# Patient Record
Sex: MALE | Race: WHITE | Employment: FULL TIME | URBAN - METROPOLITAN AREA
[De-identification: names, ages, dates, MRNs, and addresses within clinical notes are randomized per-mention and may not be internally consistent; named-entity substitution may affect disease eponyms.]

---

## 2017-01-13 PROBLEM — R73.09 ELEVATED HEMOGLOBIN A1C MEASUREMENT: Status: ACTIVE | Noted: 2017-01-13

## 2017-09-07 PROBLEM — I10 ESSENTIAL HYPERTENSION: Status: ACTIVE | Noted: 2017-09-07

## 2017-11-27 ENCOUNTER — HOSPITAL ENCOUNTER (OUTPATIENT)
Dept: LAB | Age: 77
Discharge: HOME | End: 2017-11-27
Attending: INTERNAL MEDICINE
Payer: MEDICARE

## 2017-11-27 DIAGNOSIS — I25.10 CORONARY ARTERY DISEASE INVOLVING NATIVE CORONARY ARTERY OF NATIVE HEART WITHOUT ANGINA PECTORIS: ICD-10-CM

## 2017-11-27 DIAGNOSIS — E78.5 HYPERLIPIDEMIA, UNSPECIFIED HYPERLIPIDEMIA TYPE: Chronic | ICD-10-CM

## 2017-11-27 LAB
CHOLEST SERPL-MCNC: 187 MG/DL
HDLC SERPL-MCNC: 49 MG/DL (ref 40–60)
HDLC SERPL: 3.8 {RATIO}
LDLC SERPL CALC-MCNC: 112.6 MG/DL
LIPID PROFILE,FLP: ABNORMAL
TRIGL SERPL-MCNC: 127 MG/DL (ref 35–150)
VLDLC SERPL CALC-MCNC: 25.4 MG/DL (ref 6–23)

## 2017-11-27 PROCEDURE — 80061 LIPID PANEL: CPT | Performed by: INTERNAL MEDICINE

## 2017-11-27 PROCEDURE — 36415 COLL VENOUS BLD VENIPUNCTURE: CPT | Performed by: INTERNAL MEDICINE

## 2018-02-13 PROBLEM — I49.3 PVC (PREMATURE VENTRICULAR CONTRACTION): Status: ACTIVE | Noted: 2018-02-13

## 2019-10-21 ENCOUNTER — HOSPITAL ENCOUNTER (OUTPATIENT)
Dept: LAB | Age: 79
Discharge: HOME | End: 2019-10-21

## 2019-10-21 PROCEDURE — 88305 TISSUE EXAM BY PATHOLOGIST: CPT

## 2020-11-05 ENCOUNTER — TRANSCRIBE ORDER (OUTPATIENT)
Dept: SCHEDULING | Age: 80
End: 2020-11-05

## 2020-11-05 DIAGNOSIS — R42 DIZZINESS: Primary | ICD-10-CM

## 2020-11-06 ENCOUNTER — HOSPITAL ENCOUNTER (OUTPATIENT)
Dept: CT IMAGING | Age: 80
Discharge: HOME | End: 2020-11-06
Attending: INTERNAL MEDICINE
Payer: MEDICARE

## 2020-11-06 ENCOUNTER — HOSPITAL ENCOUNTER (OUTPATIENT)
Dept: LAB | Age: 80
Discharge: HOME | End: 2020-11-06
Attending: INTERNAL MEDICINE
Payer: MEDICARE

## 2020-11-06 ENCOUNTER — TRANSCRIBE ORDER (OUTPATIENT)
Dept: SCHEDULING | Age: 80
End: 2020-11-06

## 2020-11-06 DIAGNOSIS — R42 DIZZINESS: Primary | ICD-10-CM

## 2020-11-06 DIAGNOSIS — R79.89 ABNORMAL TSH: ICD-10-CM

## 2020-11-06 DIAGNOSIS — R19.8 ABNORMAL BOWEL MOVEMENT: ICD-10-CM

## 2020-11-06 DIAGNOSIS — I25.10 CORONARY ARTERY DISEASE INVOLVING NATIVE CORONARY ARTERY OF NATIVE HEART WITHOUT ANGINA PECTORIS: ICD-10-CM

## 2020-11-06 DIAGNOSIS — R42 DIZZINESS: ICD-10-CM

## 2020-11-06 DIAGNOSIS — Z00.00 MEDICARE ANNUAL WELLNESS VISIT, SUBSEQUENT: ICD-10-CM

## 2020-11-06 DIAGNOSIS — Z71.89 ADVANCED DIRECTIVES, COUNSELING/DISCUSSION: ICD-10-CM

## 2020-11-06 DIAGNOSIS — J42 CHRONIC BRONCHITIS, UNSPECIFIED CHRONIC BRONCHITIS TYPE (HCC): ICD-10-CM

## 2020-11-06 DIAGNOSIS — R73.01 IFG (IMPAIRED FASTING GLUCOSE): ICD-10-CM

## 2020-11-06 DIAGNOSIS — Z13.39 SCREENING FOR ALCOHOLISM: ICD-10-CM

## 2020-11-06 DIAGNOSIS — Z13.31 SCREENING FOR DEPRESSION: ICD-10-CM

## 2020-11-06 LAB
ALBUMIN SERPL-MCNC: 3.4 G/DL (ref 3.2–4.6)
ALBUMIN/GLOB SERPL: 1.2 {RATIO} (ref 1.2–3.5)
ALP SERPL-CCNC: 89 U/L (ref 50–136)
ALT SERPL-CCNC: 19 U/L (ref 12–65)
ANION GAP SERPL CALC-SCNC: 9 MMOL/L (ref 7–16)
AST SERPL-CCNC: 11 U/L (ref 15–37)
BILIRUB SERPL-MCNC: 0.8 MG/DL (ref 0.2–1.1)
BUN SERPL-MCNC: 24 MG/DL (ref 8–23)
CALCIUM SERPL-MCNC: 8.7 MG/DL (ref 8.3–10.4)
CHLORIDE SERPL-SCNC: 101 MMOL/L (ref 98–107)
CHOLEST SERPL-MCNC: 188 MG/DL
CO2 SERPL-SCNC: 28 MMOL/L (ref 21–32)
CREAT SERPL-MCNC: 0.97 MG/DL (ref 0.8–1.5)
CREAT UR-MCNC: 102 MG/DL
ERYTHROCYTE [DISTWIDTH] IN BLOOD BY AUTOMATED COUNT: 14.6 % (ref 11.9–14.6)
ERYTHROCYTE [SEDIMENTATION RATE] IN BLOOD: 15 MM/HR (ref 0–20)
EST. AVERAGE GLUCOSE BLD GHB EST-MCNC: 137 MG/DL
GLOBULIN SER CALC-MCNC: 2.9 G/DL (ref 2.3–3.5)
GLUCOSE SERPL-MCNC: 85 MG/DL (ref 65–100)
HBA1C MFR BLD: 6.4 %
HCT VFR BLD AUTO: 46.7 % (ref 41.1–50.3)
HDLC SERPL-MCNC: 77 MG/DL (ref 40–60)
HDLC SERPL: 2.4 {RATIO}
HGB BLD-MCNC: 15.4 G/DL (ref 13.6–17.2)
LDLC SERPL CALC-MCNC: 97.2 MG/DL
LIPID PROFILE,FLP: ABNORMAL
MCH RBC QN AUTO: 29.5 PG (ref 26.1–32.9)
MCHC RBC AUTO-ENTMCNC: 33 G/DL (ref 31.4–35)
MCV RBC AUTO: 89.5 FL (ref 79.6–97.8)
MICROALBUMIN UR-MCNC: 3.08 MG/DL
MICROALBUMIN/CREAT UR-RTO: 30 MG/G
NRBC # BLD: 0 K/UL (ref 0–0.2)
PLATELET # BLD AUTO: 223 K/UL (ref 150–450)
PMV BLD AUTO: 8.7 FL (ref 9.4–12.3)
POTASSIUM SERPL-SCNC: 3.8 MMOL/L (ref 3.5–5.1)
PROT SERPL-MCNC: 6.3 G/DL (ref 6.3–8.2)
RBC # BLD AUTO: 5.22 M/UL (ref 4.23–5.6)
SODIUM SERPL-SCNC: 138 MMOL/L (ref 136–145)
TRIGL SERPL-MCNC: 69 MG/DL (ref 35–150)
TSH SERPL DL<=0.005 MIU/L-ACNC: 2.88 UIU/ML
VIT B12 SERPL-MCNC: 390 PG/ML (ref 193–986)
VLDLC SERPL CALC-MCNC: 13.8 MG/DL (ref 6–23)
WBC # BLD AUTO: 8.7 K/UL (ref 4.3–11.1)

## 2020-11-06 PROCEDURE — 83036 HEMOGLOBIN GLYCOSYLATED A1C: CPT

## 2020-11-06 PROCEDURE — 85027 COMPLETE CBC AUTOMATED: CPT

## 2020-11-06 PROCEDURE — 82607 VITAMIN B-12: CPT

## 2020-11-06 PROCEDURE — 80061 LIPID PANEL: CPT

## 2020-11-06 PROCEDURE — 70450 CT HEAD/BRAIN W/O DYE: CPT

## 2020-11-06 PROCEDURE — 85652 RBC SED RATE AUTOMATED: CPT

## 2020-11-06 PROCEDURE — 84443 ASSAY THYROID STIM HORMONE: CPT

## 2020-11-06 PROCEDURE — 82043 UR ALBUMIN QUANTITATIVE: CPT

## 2020-11-06 PROCEDURE — 80053 COMPREHEN METABOLIC PANEL: CPT

## 2021-09-13 PROCEDURE — 88305 TISSUE EXAM BY PATHOLOGIST: CPT

## 2021-09-14 ENCOUNTER — HOSPITAL ENCOUNTER (OUTPATIENT)
Dept: LAB | Age: 81
Discharge: HOME | End: 2021-09-14

## 2022-03-18 PROBLEM — I10 ESSENTIAL HYPERTENSION: Status: ACTIVE | Noted: 2017-09-07

## 2022-03-19 PROBLEM — I49.3 PVC (PREMATURE VENTRICULAR CONTRACTION): Status: ACTIVE | Noted: 2018-02-13

## 2022-03-19 PROBLEM — R73.09 ELEVATED HEMOGLOBIN A1C MEASUREMENT: Status: ACTIVE | Noted: 2017-01-13

## 2022-05-12 ENCOUNTER — HOSPITAL ENCOUNTER (OUTPATIENT)
Dept: LAB | Age: 82
Discharge: HOME | End: 2022-05-12

## 2022-05-12 PROCEDURE — 88305 TISSUE EXAM BY PATHOLOGIST: CPT

## 2022-06-20 ENCOUNTER — OFFICE VISIT (OUTPATIENT)
Dept: INTERNAL MEDICINE CLINIC | Facility: CLINIC | Age: 82
End: 2022-06-20
Payer: MEDICARE

## 2022-06-20 VITALS
HEART RATE: 96 BPM | OXYGEN SATURATION: 94 % | SYSTOLIC BLOOD PRESSURE: 128 MMHG | HEIGHT: 73 IN | BODY MASS INDEX: 19.99 KG/M2 | DIASTOLIC BLOOD PRESSURE: 82 MMHG | WEIGHT: 150.8 LBS

## 2022-06-20 DIAGNOSIS — E53.8 VITAMIN B 12 DEFICIENCY: ICD-10-CM

## 2022-06-20 DIAGNOSIS — R53.83 OTHER FATIGUE: ICD-10-CM

## 2022-06-20 DIAGNOSIS — I10 ESSENTIAL HYPERTENSION: ICD-10-CM

## 2022-06-20 DIAGNOSIS — R41.82 ALTERED MENTAL STATUS, UNSPECIFIED ALTERED MENTAL STATUS TYPE: Primary | ICD-10-CM

## 2022-06-20 DIAGNOSIS — E87.6 HYPOKALEMIA: ICD-10-CM

## 2022-06-20 DIAGNOSIS — D50.9 IRON DEFICIENCY ANEMIA, UNSPECIFIED IRON DEFICIENCY ANEMIA TYPE: ICD-10-CM

## 2022-06-20 LAB
ALBUMIN SERPL-MCNC: 2.9 G/DL (ref 3.2–4.6)
ALBUMIN/GLOB SERPL: 0.7 {RATIO} (ref 1.2–3.5)
ALP SERPL-CCNC: 80 U/L (ref 50–136)
ALT SERPL-CCNC: 11 U/L (ref 12–65)
ANION GAP SERPL CALC-SCNC: 8 MMOL/L (ref 7–16)
AST SERPL-CCNC: 12 U/L (ref 15–37)
BASOPHILS # BLD: 0 K/UL (ref 0–0.2)
BASOPHILS NFR BLD: 1 % (ref 0–2)
BILIRUB SERPL-MCNC: 1 MG/DL (ref 0.2–1.1)
BUN SERPL-MCNC: 20 MG/DL (ref 8–23)
CALCIUM SERPL-MCNC: 8.8 MG/DL (ref 8.3–10.4)
CHLORIDE SERPL-SCNC: 101 MMOL/L (ref 98–107)
CO2 SERPL-SCNC: 30 MMOL/L (ref 21–32)
CREAT SERPL-MCNC: 0.9 MG/DL (ref 0.8–1.5)
DIFFERENTIAL METHOD BLD: ABNORMAL
EOSINOPHIL # BLD: 0.1 K/UL (ref 0–0.8)
EOSINOPHIL NFR BLD: 2 % (ref 0.5–7.8)
ERYTHROCYTE [DISTWIDTH] IN BLOOD BY AUTOMATED COUNT: 14 % (ref 11.9–14.6)
GLOBULIN SER CALC-MCNC: 4 G/DL (ref 2.3–3.5)
GLUCOSE SERPL-MCNC: 89 MG/DL (ref 65–100)
HCT VFR BLD AUTO: 39.7 % (ref 41.1–50.3)
HGB BLD-MCNC: 13 G/DL (ref 13.6–17.2)
IMM GRANULOCYTES # BLD AUTO: 0.1 K/UL (ref 0–0.5)
IMM GRANULOCYTES NFR BLD AUTO: 2 % (ref 0–5)
IRON SERPL-MCNC: 26 UG/DL (ref 35–150)
LYMPHOCYTES # BLD: 1.5 K/UL (ref 0.5–4.6)
LYMPHOCYTES NFR BLD: 32 % (ref 13–44)
MCH RBC QN AUTO: 28.5 PG (ref 26.1–32.9)
MCHC RBC AUTO-ENTMCNC: 32.7 G/DL (ref 31.4–35)
MCV RBC AUTO: 87.1 FL (ref 79.6–97.8)
MONOCYTES # BLD: 0.6 K/UL (ref 0.1–1.3)
MONOCYTES NFR BLD: 13 % (ref 4–12)
NEUTS SEG # BLD: 2.3 K/UL (ref 1.7–8.2)
NEUTS SEG NFR BLD: 50 % (ref 43–78)
NRBC # BLD: 0 K/UL (ref 0–0.2)
PLATELET # BLD AUTO: 239 K/UL (ref 150–450)
PMV BLD AUTO: 9.6 FL (ref 9.4–12.3)
POTASSIUM SERPL-SCNC: 2.8 MMOL/L (ref 3.5–5.1)
PROT SERPL-MCNC: 6.9 G/DL (ref 6.3–8.2)
RBC # BLD AUTO: 4.56 M/UL (ref 4.23–5.6)
SODIUM SERPL-SCNC: 139 MMOL/L (ref 136–145)
TSH, 3RD GENERATION: 2.02 UIU/ML (ref 0.36–3.74)
WBC # BLD AUTO: 4.6 K/UL (ref 4.3–11.1)

## 2022-06-20 PROCEDURE — 96372 THER/PROPH/DIAG INJ SC/IM: CPT | Performed by: INTERNAL MEDICINE

## 2022-06-20 PROCEDURE — 99215 OFFICE O/P EST HI 40 MIN: CPT | Performed by: INTERNAL MEDICINE

## 2022-06-20 PROCEDURE — 1036F TOBACCO NON-USER: CPT | Performed by: INTERNAL MEDICINE

## 2022-06-20 PROCEDURE — G8427 DOCREV CUR MEDS BY ELIG CLIN: HCPCS | Performed by: INTERNAL MEDICINE

## 2022-06-20 PROCEDURE — 1123F ACP DISCUSS/DSCN MKR DOCD: CPT | Performed by: INTERNAL MEDICINE

## 2022-06-20 PROCEDURE — G8420 CALC BMI NORM PARAMETERS: HCPCS | Performed by: INTERNAL MEDICINE

## 2022-06-20 RX ORDER — ESCITALOPRAM OXALATE 10 MG/1
10 TABLET ORAL DAILY
Qty: 30 TABLET | Refills: 1 | Status: SHIPPED | OUTPATIENT
Start: 2022-06-20 | End: 2022-10-20

## 2022-06-20 RX ORDER — CYANOCOBALAMIN 1000 UG/ML
1000 INJECTION INTRAMUSCULAR; SUBCUTANEOUS ONCE
Status: COMPLETED | OUTPATIENT
Start: 2022-06-20 | End: 2022-06-20

## 2022-06-20 RX ADMIN — CYANOCOBALAMIN 1000 MCG: 1000 INJECTION INTRAMUSCULAR; SUBCUTANEOUS at 16:39

## 2022-06-20 ASSESSMENT — ENCOUNTER SYMPTOMS
CHEST TIGHTNESS: 0
SHORTNESS OF BREATH: 0

## 2022-06-20 ASSESSMENT — PATIENT HEALTH QUESTIONNAIRE - PHQ9
SUM OF ALL RESPONSES TO PHQ QUESTIONS 1-9: 2
SUM OF ALL RESPONSES TO PHQ9 QUESTIONS 1 & 2: 2
SUM OF ALL RESPONSES TO PHQ QUESTIONS 1-9: 2
SUM OF ALL RESPONSES TO PHQ QUESTIONS 1-9: 2
1. LITTLE INTEREST OR PLEASURE IN DOING THINGS: 2
SUM OF ALL RESPONSES TO PHQ QUESTIONS 1-9: 2
2. FEELING DOWN, DEPRESSED OR HOPELESS: 0

## 2022-06-20 NOTE — PROGRESS NOTES
Marilyn Rausch was seen today for fatigue, weight loss and other. Diagnoses and all orders for this visit:    Altered mental status, unspecified altered mental status type  -     MRI BRAIN WO CONTRAST; Future  -     CBC with Auto Differential; Future  -     TSH; Future  -     Comprehensive Metabolic Panel; Future  -     Comprehensive Metabolic Panel  -     TSH  -     CBC with Auto Differential    Essential hypertension  -     Comprehensive Metabolic Panel; Future  -     Comprehensive Metabolic Panel    Other fatigue  -     CBC with Auto Differential; Future  -     TSH; Future  -     Comprehensive Metabolic Panel; Future  -     Comprehensive Metabolic Panel  -     TSH  -     CBC with Auto Differential    Iron deficiency anemia, unspecified iron deficiency anemia type  -     Iron; Future  -     Iron    Vitamin B 12 deficiency  -     cyanocobalamin injection 1,000 mcg    Other orders  -     escitalopram (LEXAPRO) 10 MG tablet; Take 1 tablet by mouth daily  -     potassium chloride (KLOR-CON M) 20 MEQ extended release tablet;  Take 1 tablet by mouth 3 times daily          Arianna Ibrahim is a 78 y.o. male    Chief Complaint   Patient presents with    Fatigue     GI did labs results scan in    Weight Loss    Other     patient states he thinks he has had a stroke         Results for orders placed or performed in visit on 06/20/22   Iron   Result Value Ref Range    Iron 26 (L) 35 - 150 ug/dL   Comprehensive Metabolic Panel   Result Value Ref Range    Sodium 139 136 - 145 mmol/L    Potassium 2.8 (L) 3.5 - 5.1 mmol/L    Chloride 101 98 - 107 mmol/L    CO2 30 21 - 32 mmol/L    Anion Gap 8 7 - 16 mmol/L    Glucose 89 65 - 100 mg/dL    BUN 20 8 - 23 MG/DL    CREATININE 0.90 0.8 - 1.5 MG/DL    GFR African American >60 >60 ml/min/1.73m2    GFR Non- >60 >60 ml/min/1.73m2    Calcium 8.8 8.3 - 10.4 MG/DL    Total Bilirubin 1.0 0.2 - 1.1 MG/DL    ALT 11 (L) 12 - 65 U/L    AST 12 (L) 15 - 37 U/L    Alk Phosphatase 80 50 - 136 U/L    Total Protein 6.9 6.3 - 8.2 g/dL    Albumin 2.9 (L) 3.2 - 4.6 g/dL    Globulin 4.0 (H) 2.3 - 3.5 g/dL    Albumin/Globulin Ratio 0.7 (L) 1.2 - 3.5     TSH   Result Value Ref Range    TSH, 3RD GENERATION 2.020 0.358 - 3.740 uIU/mL   CBC with Auto Differential   Result Value Ref Range    WBC 4.6 4.3 - 11.1 K/uL    RBC 4.56 4.23 - 5.6 M/uL    Hemoglobin 13.0 (L) 13.6 - 17.2 g/dL    Hematocrit 39.7 (L) 41.1 - 50.3 %    MCV 87.1 79.6 - 97.8 FL    MCH 28.5 26.1 - 32.9 PG    MCHC 32.7 31.4 - 35.0 g/dL    RDW 14.0 11.9 - 14.6 %    Platelets 976 204 - 733 K/uL    MPV 9.6 9.4 - 12.3 FL    nRBC 0.00 0.0 - 0.2 K/uL    Differential Type AUTOMATED      Seg Neutrophils 50 43 - 78 %    Lymphocytes 32 13 - 44 %    Monocytes 13 (H) 4.0 - 12.0 %    Eosinophils % 2 0.5 - 7.8 %    Basophils 1 0.0 - 2.0 %    Immature Granulocytes 2 0.0 - 5.0 %    Segs Absolute 2.3 1.7 - 8.2 K/UL    Absolute Lymph # 1.5 0.5 - 4.6 K/UL    Absolute Mono # 0.6 0.1 - 1.3 K/UL    Absolute Eos # 0.1 0.0 - 0.8 K/UL    Basophils Absolute 0.0 0.0 - 0.2 K/UL    Absolute Immature Granulocyte 0.1 0.0 - 0.5 K/UL       Past Medical History:   Diagnosis Date    Abnormal cardiovascular function study     Aortic valve regurgitation 01/04/2016    Echo:LV EF=55%. Normal LV size. Mild to moderate AI with mildly dilated aortic root (3.8 cm) and aortic sclerosis.  Bilateral cataracts     Colon polyps     COPD (chronic obstructive pulmonary disease) (HCC)     Coronary atherosclerosis of native coronary vessel 02/12/2016    Coronary clcifications noted on chest CT scan on 12-4-2015. Normal myocardial perfusion scan (normal perfusion and EF=76%) on 2-12-16.     Elevated hemoglobin A1c measurement 1/13/2017    Hyperlipidemia 9/6/2016       Family History   Problem Relation Age of Onset    Cancer Father         Colon    Hypertension Mother         Social History     Socioeconomic History    Marital status:      Spouse name: Not on file  Number of children: Not on file    Years of education: Not on file    Highest education level: Not on file   Occupational History    Not on file   Tobacco Use    Smoking status: Former Smoker     Quit date: 1990     Years since quittin.5    Smokeless tobacco: Never Used   Substance and Sexual Activity    Alcohol use: No     Alcohol/week: 0.0 standard drinks    Drug use: No    Sexual activity: Not on file   Other Topics Concern    Not on file   Social History Narrative    Not on file     Social Determinants of Health     Financial Resource Strain:     Difficulty of Paying Living Expenses: Not on file   Food Insecurity:     Worried About [address] [employer] in the Last Year: Not on file    [employer] in the Last Year: Not on file   Transportation Needs:     Lack of Transportation (Medical): Not on file    Lack of Transportation (Non-Medical):  Not on file   Physical Activity:     Days of Exercise per Week: Not on file    Minutes of Exercise per Session: Not on file   Stress:     Feeling of Stress : Not on file   Social Connections:     Frequency of Communication with Friends and Family: Not on file    Frequency of Social Gatherings with Friends and Family: Not on file    Attends Anabaptism Services: Not on file    Active Member of [address] [place] [address] [employer] or Organizations: Not on file    Attends Club or Organization Meetings: Not on file    Marital Status: Not on file   Intimate Partner Violence:     Fear of Current or Ex-Partner: Not on file    Emotionally Abused: Not on file    Physically Abused: Not on file    Sexually Abused: Not on file   Housing Stability:     Unable to Pay for Housing in the Last Year: Not on file    Number of [place] in the Last Year: Not on file    Unstable Housing in the Last Year: Not on file         Current Outpatient Medications:     potassium chloride (KLOR-CON M) 20 MEQ extended release tablet, Take 1 tablet by mouth 3 times daily, Disp: 45 tablet, Rfl: 1    escitalopram (LEXAPRO) 10 MG tablet, Take 1 tablet by mouth daily, Disp: 30 tablet, Rfl: 1    cyanocobalamin 1000 MCG/ML injection, Inject 1,000 mcg into the muscle every 30 days (Patient not taking: Reported on 6/20/2022), Disp: , Rfl:     Allergies   Allergen Reactions    Erythromycin Rash    Sulfa Antibiotics Rash         Review of Systems   Constitutional: Positive for fatigue and unexpected weight change. Respiratory: Negative for chest tightness and shortness of breath. Cardiovascular: Negative for chest pain. Genitourinary: Negative for dysuria. Skin: Negative for rash. Neurological: Negative for dizziness. Psychiatric/Behavioral: Negative for agitation, dysphoric mood and suicidal ideas. The patient is not nervous/anxious. Vitals:    06/20/22 1550   BP: 128/82   Pulse: 96   SpO2: 94%   Weight: 150 lb 12.8 oz (68.4 kg)   Height: 6' 1\" (1.854 m)           Physical Exam  Constitutional:       General: He is not in acute distress. Appearance: He is underweight. He is not ill-appearing. HENT:      Head: Normocephalic and atraumatic. Nose: Nose normal.   Eyes:      General: No scleral icterus. Extraocular Movements: Extraocular movements intact. Conjunctiva/sclera: Conjunctivae normal.   Cardiovascular:      Rate and Rhythm: Normal rate and regular rhythm. Pulses: Normal pulses. Heart sounds: Normal heart sounds. Pulmonary:      Effort: Pulmonary effort is normal. No respiratory distress. Breath sounds: Normal breath sounds. Abdominal:      General: Abdomen is flat. Bowel sounds are normal.      Palpations: Abdomen is soft. Musculoskeletal:      Cervical back: Neck supple. No rigidity. Skin:     Coloration: Skin is not jaundiced. Neurological:      General: No focal deficit present. Mental Status: He is alert and oriented to person, place, and time. Cranial Nerves: Cranial nerves are intact.       Coordination: Coordination is intact. Gait: Gait abnormal.   Psychiatric:         Mood and Affect: Mood normal.         Thought Content: Thought content normal.            Makeda Butler was seen today for fatigue, weight loss and other. Diagnoses and all orders for this visit:    Altered mental status, unspecified altered mental status type  -     MRI BRAIN WO CONTRAST; Future  -     CBC with Auto Differential; Future  -     TSH; Future  -     Comprehensive Metabolic Panel; Future  -     Comprehensive Metabolic Panel  -     TSH  -     CBC with Auto Differential    Essential hypertension  -     Comprehensive Metabolic Panel; Future  -     Comprehensive Metabolic Panel    Other fatigue  -     CBC with Auto Differential; Future  -     TSH; Future  -     Comprehensive Metabolic Panel; Future  -     Comprehensive Metabolic Panel  -     TSH  -     CBC with Auto Differential    Iron deficiency anemia, unspecified iron deficiency anemia type  -     Iron; Future  -     Iron    Vitamin B 12 deficiency  -     cyanocobalamin injection 1,000 mcg    Other orders  -     escitalopram (LEXAPRO) 10 MG tablet; Take 1 tablet by mouth daily  -     potassium chloride (KLOR-CON M) 20 MEQ extended release tablet;  Take 1 tablet by mouth 3 times daily                 Devika Garcia, DO

## 2022-06-21 RX ORDER — POTASSIUM CHLORIDE 20 MEQ/1
20 TABLET, EXTENDED RELEASE ORAL 3 TIMES DAILY
Qty: 45 TABLET | Refills: 1 | Status: SHIPPED | OUTPATIENT
Start: 2022-06-21 | End: 2022-10-20

## 2022-06-24 ENCOUNTER — NURSE ONLY (OUTPATIENT)
Dept: INTERNAL MEDICINE CLINIC | Facility: CLINIC | Age: 82
End: 2022-06-24

## 2022-06-24 DIAGNOSIS — E87.6 HYPOKALEMIA: Primary | ICD-10-CM

## 2022-06-24 LAB — POTASSIUM SERPL-SCNC: 4.4 MMOL/L (ref 3.5–5.1)

## 2022-07-05 ENCOUNTER — OFFICE VISIT (OUTPATIENT)
Dept: INTERNAL MEDICINE CLINIC | Facility: CLINIC | Age: 82
End: 2022-07-05
Payer: MEDICARE

## 2022-07-05 VITALS
HEART RATE: 61 BPM | TEMPERATURE: 97.1 F | DIASTOLIC BLOOD PRESSURE: 78 MMHG | WEIGHT: 142 LBS | SYSTOLIC BLOOD PRESSURE: 130 MMHG | HEIGHT: 73 IN | BODY MASS INDEX: 18.82 KG/M2 | OXYGEN SATURATION: 97 %

## 2022-07-05 DIAGNOSIS — E87.6 HYPOKALEMIA: ICD-10-CM

## 2022-07-05 DIAGNOSIS — R63.4 WEIGHT LOSS: Primary | ICD-10-CM

## 2022-07-05 DIAGNOSIS — F33.2 SEVERE EPISODE OF RECURRENT MAJOR DEPRESSIVE DISORDER, WITHOUT PSYCHOTIC FEATURES (HCC): ICD-10-CM

## 2022-07-05 DIAGNOSIS — I10 ESSENTIAL HYPERTENSION: ICD-10-CM

## 2022-07-05 PROBLEM — F33.9 MAJOR DEPRESSIVE DISORDER, RECURRENT, UNSPECIFIED (HCC): Status: ACTIVE | Noted: 2022-07-05

## 2022-07-05 LAB
ALBUMIN SERPL-MCNC: 3.3 G/DL (ref 3.2–4.6)
ALBUMIN/GLOB SERPL: 0.8 {RATIO} (ref 1.2–3.5)
ALP SERPL-CCNC: 98 U/L (ref 50–136)
ALT SERPL-CCNC: 13 U/L (ref 12–65)
ANION GAP SERPL CALC-SCNC: 6 MMOL/L (ref 7–16)
AST SERPL-CCNC: 14 U/L (ref 15–37)
BASOPHILS # BLD: 0 K/UL (ref 0–0.2)
BASOPHILS NFR BLD: 1 % (ref 0–2)
BILIRUB SERPL-MCNC: 0.9 MG/DL (ref 0.2–1.1)
BILIRUBIN, URINE, POC: NEGATIVE
BLOOD URINE, POC: NEGATIVE
BUN SERPL-MCNC: 24 MG/DL (ref 8–23)
CALCIUM SERPL-MCNC: 9.5 MG/DL (ref 8.3–10.4)
CHLORIDE SERPL-SCNC: 101 MMOL/L (ref 98–107)
CK SERPL-CCNC: 28 U/L (ref 21–215)
CO2 SERPL-SCNC: 27 MMOL/L (ref 21–32)
CREAT SERPL-MCNC: 1 MG/DL (ref 0.8–1.5)
CRP SERPL-MCNC: 1.1 MG/DL (ref 0–0.9)
DIFFERENTIAL METHOD BLD: ABNORMAL
EOSINOPHIL # BLD: 0.1 K/UL (ref 0–0.8)
EOSINOPHIL NFR BLD: 1 % (ref 0.5–7.8)
ERYTHROCYTE [DISTWIDTH] IN BLOOD BY AUTOMATED COUNT: 14.4 % (ref 11.9–14.6)
ERYTHROCYTE [SEDIMENTATION RATE] IN BLOOD: 14 MM/HR
GLOBULIN SER CALC-MCNC: 4.1 G/DL (ref 2.3–3.5)
GLUCOSE SERPL-MCNC: 100 MG/DL (ref 65–100)
GLUCOSE URINE, POC: NEGATIVE
HCT VFR BLD AUTO: 41.9 % (ref 41.1–50.3)
HGB BLD-MCNC: 13.4 G/DL (ref 13.6–17.2)
IMM GRANULOCYTES # BLD AUTO: 0.1 K/UL (ref 0–0.5)
IMM GRANULOCYTES NFR BLD AUTO: 1 % (ref 0–5)
KETONES, URINE, POC: NEGATIVE
LEUKOCYTE ESTERASE, URINE, POC: NEGATIVE
LYMPHOCYTES # BLD: 1.3 K/UL (ref 0.5–4.6)
LYMPHOCYTES NFR BLD: 22 % (ref 13–44)
MCH RBC QN AUTO: 28 PG (ref 26.1–32.9)
MCHC RBC AUTO-ENTMCNC: 32 G/DL (ref 31.4–35)
MCV RBC AUTO: 87.7 FL (ref 79.6–97.8)
MONOCYTES # BLD: 0.8 K/UL (ref 0.1–1.3)
MONOCYTES NFR BLD: 14 % (ref 4–12)
NEUTS SEG # BLD: 3.5 K/UL (ref 1.7–8.2)
NEUTS SEG NFR BLD: 61 % (ref 43–78)
NITRITE, URINE, POC: NEGATIVE
NRBC # BLD: 0 K/UL (ref 0–0.2)
PH, URINE, POC: 5 (ref 4.6–8)
PLATELET # BLD AUTO: 250 K/UL (ref 150–450)
PMV BLD AUTO: 9.2 FL (ref 9.4–12.3)
POTASSIUM SERPL-SCNC: 4.6 MMOL/L (ref 3.5–5.1)
PROT SERPL-MCNC: 7.4 G/DL (ref 6.3–8.2)
PROTEIN,URINE, POC: NORMAL
RBC # BLD AUTO: 4.78 M/UL (ref 4.23–5.6)
SODIUM SERPL-SCNC: 134 MMOL/L (ref 138–145)
SPECIFIC GRAVITY, URINE, POC: 1.03 (ref 1–1.03)
URINALYSIS CLARITY, POC: CLEAR
URINALYSIS COLOR, POC: YELLOW
UROBILINOGEN, POC: NORMAL
WBC # BLD AUTO: 5.7 K/UL (ref 4.3–11.1)

## 2022-07-05 PROCEDURE — 81003 URINALYSIS AUTO W/O SCOPE: CPT | Performed by: INTERNAL MEDICINE

## 2022-07-05 PROCEDURE — 1123F ACP DISCUSS/DSCN MKR DOCD: CPT | Performed by: INTERNAL MEDICINE

## 2022-07-05 PROCEDURE — G8420 CALC BMI NORM PARAMETERS: HCPCS | Performed by: INTERNAL MEDICINE

## 2022-07-05 PROCEDURE — G8427 DOCREV CUR MEDS BY ELIG CLIN: HCPCS | Performed by: INTERNAL MEDICINE

## 2022-07-05 PROCEDURE — 99214 OFFICE O/P EST MOD 30 MIN: CPT | Performed by: INTERNAL MEDICINE

## 2022-07-05 PROCEDURE — 1036F TOBACCO NON-USER: CPT | Performed by: INTERNAL MEDICINE

## 2022-07-05 RX ORDER — BUPROPION HYDROCHLORIDE 150 MG/1
TABLET ORAL
Qty: 30 TABLET | Refills: 5 | Status: SHIPPED | OUTPATIENT
Start: 2022-07-05 | End: 2022-07-27

## 2022-07-05 ASSESSMENT — PATIENT HEALTH QUESTIONNAIRE - PHQ9
1. LITTLE INTEREST OR PLEASURE IN DOING THINGS: 0
SUM OF ALL RESPONSES TO PHQ QUESTIONS 1-9: 0
SUM OF ALL RESPONSES TO PHQ QUESTIONS 1-9: 0
2. FEELING DOWN, DEPRESSED OR HOPELESS: 0
SUM OF ALL RESPONSES TO PHQ9 QUESTIONS 1 & 2: 0
SUM OF ALL RESPONSES TO PHQ QUESTIONS 1-9: 0
SUM OF ALL RESPONSES TO PHQ QUESTIONS 1-9: 0

## 2022-07-05 NOTE — PROGRESS NOTES
Susu Verdugo was seen today for follow-up. Diagnoses and all orders for this visit:    Weight loss  -     CBC with Auto Differential; Future  -     XR CHEST PA LAT (2 VIEWS); Future  -     Sedimentation Rate; Future  -     CK; Future  -     C-Reactive Protein; Future  -     CK; Future  -     AMB POC URINALYSIS DIP STICK AUTO W/O MICRO  -     CK  -     C-Reactive Protein  -     Sedimentation Rate  -     CBC with Auto Differential    Essential hypertension  -     Sedimentation Rate; Future  -     CK; Future  -     C-Reactive Protein; Future  -     AMB POC URINALYSIS DIP STICK AUTO W/O MICRO  -     C-Reactive Protein  -     Sedimentation Rate    Hypokalemia  -     Comprehensive Metabolic Panel; Future  -     Sedimentation Rate; Future  -     CK; Future  -     C-Reactive Protein; Future  -     CK;  Future  -     CK  -     C-Reactive Protein  -     Sedimentation Rate  -     Comprehensive Metabolic Panel    Severe episode of recurrent major depressive disorder, without psychotic features (HCC)  -     AMB POC URINALYSIS DIP STICK AUTO W/O MICRO    Other orders  -     buPROPion (WELLBUTRIN XL) 150 MG extended release tablet; 1 every am  In addition to other meds          Gentry Ba is a 78 y.o. male    Chief Complaint   Patient presents with    Follow-up     2 week   hypokalemia  No energy  No walking much at all        Results for orders placed or performed in visit on 07/05/22   CK   Result Value Ref Range    Total CK 28 21 - 215 U/L   C-Reactive Protein   Result Value Ref Range    CRP 1.1 (H) 0.0 - 0.9 mg/dL   Sedimentation Rate   Result Value Ref Range    Sed Rate, Automated 14 (L) 15 mm/hr   Comprehensive Metabolic Panel   Result Value Ref Range    Sodium 134 (L) 138 - 145 mmol/L    Potassium 4.6 3.5 - 5.1 mmol/L    Chloride 101 98 - 107 mmol/L    CO2 27 21 - 32 mmol/L    Anion Gap 6 (L) 7 - 16 mmol/L    Glucose 100 65 - 100 mg/dL    BUN 24 (H) 8 - 23 MG/DL    CREATININE 1.00 0.8 - 1.5 MG/DL    GFR African American >60 >60 ml/min/1.73m2    GFR Non- >60 >60 ml/min/1.73m2    Calcium 9.5 8.3 - 10.4 MG/DL    Total Bilirubin 0.9 0.2 - 1.1 MG/DL    ALT 13 12 - 65 U/L    AST 14 (L) 15 - 37 U/L    Alk Phosphatase 98 50 - 136 U/L    Total Protein 7.4 6.3 - 8.2 g/dL    Albumin 3.3 3.2 - 4.6 g/dL    Globulin 4.1 (H) 2.3 - 3.5 g/dL    Albumin/Globulin Ratio 0.8 (L) 1.2 - 3.5     CBC with Auto Differential   Result Value Ref Range    WBC 5.7 4.3 - 11.1 K/uL    RBC 4.78 4.23 - 5.6 M/uL    Hemoglobin 13.4 (L) 13.6 - 17.2 g/dL    Hematocrit 41.9 41.1 - 50.3 %    MCV 87.7 79.6 - 97.8 FL    MCH 28.0 26.1 - 32.9 PG    MCHC 32.0 31.4 - 35.0 g/dL    RDW 14.4 11.9 - 14.6 %    Platelets 799 809 - 952 K/uL    MPV 9.2 (L) 9.4 - 12.3 FL    nRBC 0.00 0.0 - 0.2 K/uL    Differential Type AUTOMATED      Seg Neutrophils 61 43 - 78 %    Lymphocytes 22 13 - 44 %    Monocytes 14 (H) 4.0 - 12.0 %    Eosinophils % 1 0.5 - 7.8 %    Basophils 1 0.0 - 2.0 %    Immature Granulocytes 1 0.0 - 5.0 %    Segs Absolute 3.5 1.7 - 8.2 K/UL    Absolute Lymph # 1.3 0.5 - 4.6 K/UL    Absolute Mono # 0.8 0.1 - 1.3 K/UL    Absolute Eos # 0.1 0.0 - 0.8 K/UL    Basophils Absolute 0.0 0.0 - 0.2 K/UL    Absolute Immature Granulocyte 0.1 0.0 - 0.5 K/UL   AMB POC URINALYSIS DIP STICK AUTO W/O MICRO   Result Value Ref Range    Color (UA POC) Yellow     Clarity (UA POC) Clear     Glucose, Urine, POC Negative Negative    Bilirubin, Urine, POC Negative Negative    KETONES, Urine, POC Negative Negative    Specific Gravity, Urine, POC 1.030 1.001 - 1.035    Blood (UA POC) Negative Negative    pH, Urine, POC 5.0 4.6 - 8.0    Protein, Urine, POC Trace Negative    Urobilinogen, POC 0.2 mg/dL     Nitrite, Urine, POC Negative Negative    Leukocyte Esterase, Urine, POC Negative Negative       Past Medical History:   Diagnosis Date    Abnormal cardiovascular function study     Aortic valve regurgitation 01/04/2016    Echo:LV EF=55%. Normal LV size. Mild to moderate AI with mildly dilated aortic root (3.8 cm) and aortic sclerosis.  Bilateral cataracts     Colon polyps     COPD (chronic obstructive pulmonary disease) (HCC)     Coronary atherosclerosis of native coronary vessel 2016    Coronary clcifications noted on chest CT scan on 2015. Normal myocardial perfusion scan (normal perfusion and EF=76%) on 16.  Elevated hemoglobin A1c measurement 2017    Hyperlipidemia 2016       Family History   Problem Relation Age of Onset    Cancer Father         Colon    Hypertension Mother         Social History     Socioeconomic History    Marital status:      Spouse name: Not on file    Number of children: Not on file    Years of education: Not on file    Highest education level: Not on file   Occupational History    Not on file   Tobacco Use    Smoking status: Former Smoker     Quit date: 1990     Years since quittin.6    Smokeless tobacco: Never Used   Substance and Sexual Activity    Alcohol use: No     Alcohol/week: 0.0 standard drinks    Drug use: No    Sexual activity: Not on file   Other Topics Concern    Not on file   Social History Narrative    Not on file     Social Determinants of Health     Financial Resource Strain:     Difficulty of Paying Living Expenses: Not on file   Food Insecurity:     Worried About [email or web address] in the Last Year: Not on file    [address] in the Last Year: Not on file   Transportation Needs:     Lack of Transportation (Medical): Not on file    Lack of Transportation (Non-Medical):  Not on file   Physical Activity:     Days of Exercise per Week: Not on file    Minutes of Exercise per Session: Not on file   Stress:     Feeling of Stress : Not on file   Social Connections:     Frequency of Communication with Friends and Family: Not on file    Frequency of Social Gatherings with Friends and Family: Not on file    Attends Baptism Services: Not on file   [employer] or Organizations: Not on file    Attends Club or Organization Meetings: Not on file    Marital Status: Not on file   Intimate Partner Violence:     Fear of Current or Ex-Partner: Not on file    Emotionally Abused: Not on file    Physically Abused: Not on file    Sexually Abused: Not on file   Housing Stability:     Unable to Pay for Housing in the Last Year: Not on file    Number of [place] in the Last Year: Not on file    Unstable Housing in the Last Year: Not on file         Current Outpatient Medications:     buPROPion (WELLBUTRIN XL) 150 MG extended release tablet, 1 every am  In addition to other meds, Disp: 30 tablet, Rfl: 5    potassium chloride (KLOR-CON M) 20 MEQ extended release tablet, Take 1 tablet by mouth 3 times daily, Disp: 45 tablet, Rfl: 1    escitalopram (LEXAPRO) 10 MG tablet, Take 1 tablet by mouth daily, Disp: 30 tablet, Rfl: 1    cyanocobalamin 1000 MCG/ML injection, Inject 1,000 mcg into the muscle every 30 days , Disp: , Rfl:     Allergies   Allergen Reactions    Erythromycin Rash    Sulfa Antibiotics Rash         Review of Systems   Constitutional: Negative for appetite change, chills, fatigue and fever. HENT: Negative for sinus pain. Respiratory: Negative for chest tightness, shortness of breath and wheezing. Cardiovascular: Negative for chest pain. Gastrointestinal: Negative for abdominal pain, constipation, diarrhea, nausea and vomiting. Genitourinary: Negative for dysuria and frequency. Musculoskeletal: Negative for myalgias. Neurological: Negative for dizziness. Psychiatric/Behavioral: Negative for suicidal ideas. All other systems reviewed and are negative. Vitals:    07/05/22 1014   BP: 130/78   Pulse: 61   Temp: 97.1 °F (36.2 °C)   TempSrc: Temporal   SpO2: 97%   Weight: 142 lb (64.4 kg)   Height: 6' 1\" (1.854 m)           Physical Exam  Constitutional:       Appearance: He is underweight. He is not ill-appearing. Cardiovascular:      Rate and Rhythm: Normal rate. Pulmonary:      Effort: Pulmonary effort is normal.      Breath sounds: Normal breath sounds. Abdominal:      General: There is no distension. Musculoskeletal:      Cervical back: Neck supple. Lymphadenopathy:      Cervical: No cervical adenopathy. Skin:     Coloration: Skin is not jaundiced. Neurological:      General: No focal deficit present. Mental Status: He is alert. Mental status is at baseline. Psychiatric:         Mood and Affect: Mood normal.         Thought Content: Thought content normal.            Christie Ashley was seen today for follow-up. Diagnoses and all orders for this visit:    Weight loss  -     CBC with Auto Differential; Future  -     XR CHEST PA LAT (2 VIEWS); Future  -     Sedimentation Rate; Future  -     CK; Future  -     C-Reactive Protein; Future  -     CK; Future  -     AMB POC URINALYSIS DIP STICK AUTO W/O MICRO  -     CK  -     C-Reactive Protein  -     Sedimentation Rate  -     CBC with Auto Differential    Essential hypertension  -     Sedimentation Rate; Future  -     CK; Future  -     C-Reactive Protein; Future  -     AMB POC URINALYSIS DIP STICK AUTO W/O MICRO  -     C-Reactive Protein  -     Sedimentation Rate    Hypokalemia  -     Comprehensive Metabolic Panel; Future  -     Sedimentation Rate; Future  -     CK; Future  -     C-Reactive Protein; Future  -     CK;  Future  -     CK  -     C-Reactive Protein  -     Sedimentation Rate  -     Comprehensive Metabolic Panel    Severe episode of recurrent major depressive disorder, without psychotic features (HCC)  -     AMB POC URINALYSIS DIP STICK AUTO W/O MICRO    Other orders  -     buPROPion (WELLBUTRIN XL) 150 MG extended release tablet; 1 every am  In addition to other meds                 Kalyan Aguilar DO

## 2022-07-07 ENCOUNTER — HOSPITAL ENCOUNTER (OUTPATIENT)
Dept: MRI IMAGING | Age: 82
Discharge: HOME | End: 2022-07-10
Payer: MEDICARE

## 2022-07-07 DIAGNOSIS — R41.82 ALTERED MENTAL STATUS, UNSPECIFIED ALTERED MENTAL STATUS TYPE: ICD-10-CM

## 2022-07-07 PROCEDURE — 70551 MRI BRAIN STEM W/O DYE: CPT

## 2022-07-11 ASSESSMENT — ENCOUNTER SYMPTOMS
CONSTIPATION: 0
SINUS PAIN: 0
VOMITING: 0
ABDOMINAL PAIN: 0
NAUSEA: 0
WHEEZING: 0
CHEST TIGHTNESS: 0
DIARRHEA: 0
SHORTNESS OF BREATH: 0

## 2022-07-20 ENCOUNTER — NURSE ONLY (OUTPATIENT)
Dept: INTERNAL MEDICINE CLINIC | Facility: CLINIC | Age: 82
End: 2022-07-20

## 2022-07-20 DIAGNOSIS — E87.6 HYPOKALEMIA: ICD-10-CM

## 2022-07-20 DIAGNOSIS — D72.821 MONOCYTOSES: Primary | ICD-10-CM

## 2022-07-20 LAB
ALBUMIN SERPL-MCNC: 3 G/DL (ref 3.2–4.6)
ALBUMIN/GLOB SERPL: 0.8 {RATIO} (ref 1.2–3.5)
ALP SERPL-CCNC: 98 U/L (ref 50–136)
ALT SERPL-CCNC: 10 U/L (ref 12–65)
ANION GAP SERPL CALC-SCNC: 7 MMOL/L (ref 7–16)
AST SERPL-CCNC: 13 U/L (ref 15–37)
BASOPHILS # BLD: 0 K/UL (ref 0–0.2)
BASOPHILS NFR BLD: 1 % (ref 0–2)
BILIRUB SERPL-MCNC: 1.1 MG/DL (ref 0.2–1.1)
BUN SERPL-MCNC: 18 MG/DL (ref 8–23)
CALCIUM SERPL-MCNC: 8.6 MG/DL (ref 8.3–10.4)
CHLORIDE SERPL-SCNC: 101 MMOL/L (ref 98–107)
CO2 SERPL-SCNC: 25 MMOL/L (ref 21–32)
CREAT SERPL-MCNC: 0.9 MG/DL (ref 0.8–1.5)
DIFFERENTIAL METHOD BLD: ABNORMAL
EOSINOPHIL # BLD: 0.1 K/UL (ref 0–0.8)
EOSINOPHIL NFR BLD: 2 % (ref 0.5–7.8)
ERYTHROCYTE [DISTWIDTH] IN BLOOD BY AUTOMATED COUNT: 14.7 % (ref 11.9–14.6)
GLOBULIN SER CALC-MCNC: 3.8 G/DL (ref 2.3–3.5)
GLUCOSE SERPL-MCNC: 95 MG/DL (ref 65–100)
HCT VFR BLD AUTO: 39.8 % (ref 41.1–50.3)
HETEROPH AB BLD QL IA: NEGATIVE
HGB BLD-MCNC: 12.4 G/DL (ref 13.6–17.2)
IMM GRANULOCYTES # BLD AUTO: 0 K/UL (ref 0–0.5)
IMM GRANULOCYTES NFR BLD AUTO: 1 % (ref 0–5)
LYMPHOCYTES # BLD: 1 K/UL (ref 0.5–4.6)
LYMPHOCYTES NFR BLD: 19 % (ref 13–44)
MCH RBC QN AUTO: 27.9 PG (ref 26.1–32.9)
MCHC RBC AUTO-ENTMCNC: 31.2 G/DL (ref 31.4–35)
MCV RBC AUTO: 89.4 FL (ref 79.6–97.8)
MONOCYTES # BLD: 0.6 K/UL (ref 0.1–1.3)
MONOCYTES NFR BLD: 11 % (ref 4–12)
NEUTS SEG # BLD: 3.3 K/UL (ref 1.7–8.2)
NEUTS SEG NFR BLD: 66 % (ref 43–78)
NRBC # BLD: 0 K/UL (ref 0–0.2)
PLATELET # BLD AUTO: 238 K/UL (ref 150–450)
PMV BLD AUTO: 9.4 FL (ref 9.4–12.3)
POTASSIUM SERPL-SCNC: 3.5 MMOL/L (ref 3.5–5.1)
PROT SERPL-MCNC: 6.8 G/DL (ref 6.3–8.2)
RBC # BLD AUTO: 4.45 M/UL (ref 4.23–5.6)
SODIUM SERPL-SCNC: 133 MMOL/L (ref 138–145)
WBC # BLD AUTO: 5 K/UL (ref 4.3–11.1)

## 2022-07-27 ENCOUNTER — OFFICE VISIT (OUTPATIENT)
Dept: INTERNAL MEDICINE CLINIC | Facility: CLINIC | Age: 82
End: 2022-07-27
Payer: MEDICARE

## 2022-07-27 VITALS
DIASTOLIC BLOOD PRESSURE: 82 MMHG | HEIGHT: 73 IN | WEIGHT: 145 LBS | SYSTOLIC BLOOD PRESSURE: 130 MMHG | BODY MASS INDEX: 19.22 KG/M2 | OXYGEN SATURATION: 95 % | HEART RATE: 96 BPM | TEMPERATURE: 97 F

## 2022-07-27 DIAGNOSIS — R63.4 WEIGHT LOSS: ICD-10-CM

## 2022-07-27 DIAGNOSIS — R29.6 FALLING EPISODES: ICD-10-CM

## 2022-07-27 DIAGNOSIS — E53.8 VITAMIN B 12 DEFICIENCY: Primary | ICD-10-CM

## 2022-07-27 LAB
ANION GAP SERPL CALC-SCNC: 5 MMOL/L (ref 7–16)
BUN SERPL-MCNC: 18 MG/DL (ref 8–23)
CALCIUM SERPL-MCNC: 9.3 MG/DL (ref 8.3–10.4)
CHLORIDE SERPL-SCNC: 102 MMOL/L (ref 98–107)
CO2 SERPL-SCNC: 29 MMOL/L (ref 21–32)
CREAT SERPL-MCNC: 0.9 MG/DL (ref 0.8–1.5)
CREAT UR-MCNC: 162 MG/DL
CRP SERPL-MCNC: 2.9 MG/DL (ref 0–0.9)
GLUCOSE SERPL-MCNC: 95 MG/DL (ref 65–100)
MICROALBUMIN UR-MCNC: 3.3 MG/DL
MICROALBUMIN/CREAT UR-RTO: 20 MG/G
POTASSIUM SERPL-SCNC: 3.4 MMOL/L (ref 3.5–5.1)
SODIUM SERPL-SCNC: 136 MMOL/L (ref 138–145)
TSH, 3RD GENERATION: 2.24 UIU/ML (ref 0.36–3.74)

## 2022-07-27 PROCEDURE — G8420 CALC BMI NORM PARAMETERS: HCPCS | Performed by: INTERNAL MEDICINE

## 2022-07-27 PROCEDURE — 1036F TOBACCO NON-USER: CPT | Performed by: INTERNAL MEDICINE

## 2022-07-27 PROCEDURE — G8427 DOCREV CUR MEDS BY ELIG CLIN: HCPCS | Performed by: INTERNAL MEDICINE

## 2022-07-27 PROCEDURE — 96372 THER/PROPH/DIAG INJ SC/IM: CPT | Performed by: INTERNAL MEDICINE

## 2022-07-27 PROCEDURE — 1123F ACP DISCUSS/DSCN MKR DOCD: CPT | Performed by: INTERNAL MEDICINE

## 2022-07-27 PROCEDURE — 99214 OFFICE O/P EST MOD 30 MIN: CPT | Performed by: INTERNAL MEDICINE

## 2022-07-27 RX ORDER — CYANOCOBALAMIN 1000 UG/ML
1000 INJECTION INTRAMUSCULAR; SUBCUTANEOUS
Status: SHIPPED | OUTPATIENT
Start: 2022-07-27

## 2022-07-27 RX ADMIN — CYANOCOBALAMIN 1000 MCG: 1000 INJECTION INTRAMUSCULAR; SUBCUTANEOUS at 10:29

## 2022-07-27 NOTE — PROGRESS NOTES
Keith Rendon was seen today for follow-up. Diagnoses and all orders for this visit:    Vitamin B 12 deficiency  -     cyanocobalamin injection 1,000 mcg  -     Sedimentation Rate; Future  -     C-Reactive Protein; Future  -     Basic Metabolic Panel; Future  -     TSH; Future  -     Sedimentation Rate  -     C-Reactive Protein  -     Basic Metabolic Panel  -     TSH    Falling episodes  -     [facility], [facility]  -     Sedimentation Rate; Future  -     C-Reactive Protein; Future  -     Basic Metabolic Panel; Future  -     TSH; Future  -     Sedimentation Rate  -     C-Reactive Protein  -     Basic Metabolic Panel  -     TSH    Weight loss  -     Sedimentation Rate; Future  -     C-Reactive Protein; Future  -     Basic Metabolic Panel; Future  -     XR CHEST PA LAT (2 VIEWS); Future  -     TSH; Future  -     Microalbumin / Creatinine Urine Ratio; Future  -     Sedimentation Rate  -     C-Reactive Protein  -     Basic Metabolic Panel  -     TSH  -     Microalbumin / Creatinine Urine Ratio        Angela Hernandez is a 78 y.o. male    Chief Complaint   Patient presents with    Follow-up     6 month and review labs     Seems to miss his work   Trying eat more  Decided not take meds at all-lexapro/wellbutrin. Seemed dpressed in the rcent weeks. No results found for this visit on 07/27/22. Past Medical History:   Diagnosis Date    Abnormal cardiovascular function study     Aortic valve regurgitation 01/04/2016    Echo:LV EF=55%. Normal LV size. Mild to moderate AI with mildly dilated aortic root (3.8 cm) and aortic sclerosis. Bilateral cataracts     Colon polyps     COPD (chronic obstructive pulmonary disease) (HCC)     Coronary atherosclerosis of native coronary vessel 02/12/2016    Coronary clcifications noted on chest CT scan on 12-4-2015. Normal myocardial perfusion scan (normal perfusion and EF=76%) on 2-12-16.     Elevated hemoglobin A1c measurement 1/13/2017 Hyperlipidemia 2016       Family History   Problem Relation Age of Onset    Cancer Father         Colon    Hypertension Mother         Social History     Socioeconomic History    Marital status:      Spouse name: Not on file    Number of children: Not on file    Years of education: Not on file    Highest education level: Not on file   Occupational History    Not on file   Tobacco Use    Smoking status: Former     Types: Cigarettes     Quit date: 1990     Years since quittin.6    Smokeless tobacco: Never   Substance and Sexual Activity    Alcohol use: No     Alcohol/week: 0.0 standard drinks    Drug use: No    Sexual activity: Not on file   Other Topics Concern    Not on file   Social History Narrative    Not on file     Social Determinants of Health     Financial Resource Strain: Not on file   Food Insecurity: Not on file   Transportation Needs: Not on file   Physical Activity: Not on file   Stress: Not on file   Social Connections: Not on file   Intimate Partner Violence: Not on file   Housing Stability: Not on file         Current Outpatient Medications:     cyanocobalamin 1000 MCG/ML injection, Inject 1,000 mcg into the muscle every 30 days , Disp: , Rfl:     potassium chloride (KLOR-CON M) 20 MEQ extended release tablet, Take 1 tablet by mouth 3 times daily (Patient not taking: Reported on 2022), Disp: 45 tablet, Rfl: 1    escitalopram (LEXAPRO) 10 MG tablet, Take 1 tablet by mouth daily (Patient not taking: Reported on 2022), Disp: 30 tablet, Rfl: 1    Allergies   Allergen Reactions    Erythromycin Rash    Sulfa Antibiotics Rash         Review of Systems      Vitals:    22 1023   BP: 130/82   Pulse: 96   Temp: 97 °F (36.1 °C)   TempSrc: Temporal   SpO2: 95%   Weight: 145 lb (65.8 kg)   Height: 6' 1\" (1.854 m)           Physical Exam       Moody Lefort was seen today for follow-up.     Diagnoses and all orders for this visit:    Vitamin B 12 deficiency  -     cyanocobalamin injection 1,000 mcg  -     Sedimentation Rate; Future  -     C-Reactive Protein; Future  -     Basic Metabolic Panel; Future  -     TSH; Future  -     Sedimentation Rate  -     C-Reactive Protein  -     Basic Metabolic Panel  -     TSH    Falling episodes  -     [facility]  -     Sedimentation Rate; Future  -     C-Reactive Protein; Future  -     Basic Metabolic Panel; Future  -     TSH; Future  -     Sedimentation Rate  -     C-Reactive Protein  -     Basic Metabolic Panel  -     TSH    Weight loss  -     Sedimentation Rate; Future  -     C-Reactive Protein; Future  -     Basic Metabolic Panel; Future  -     XR CHEST PA LAT (2 VIEWS); Future  -     TSH; Future  -     Microalbumin / Creatinine Urine Ratio;  Future  -     Sedimentation Rate  -     C-Reactive Protein  -     Basic Metabolic Panel  -     TSH  -     Microalbumin / Creatinine Urine Ratio               Patricia Chowdhury DO

## 2022-07-28 LAB — ERYTHROCYTE [SEDIMENTATION RATE] IN BLOOD: 37 MM/HR

## 2022-07-28 RX ORDER — PREDNISONE 1 MG/1
5 TABLET ORAL 2 TIMES DAILY
Qty: 60 TABLET | Refills: 0 | Status: SHIPPED | OUTPATIENT
Start: 2022-07-28 | End: 2022-08-15 | Stop reason: SDUPTHER

## 2022-08-10 ENCOUNTER — HOSPITAL ENCOUNTER (EMERGENCY)
Age: 82
Discharge: HOME | End: 2022-08-10
Attending: EMERGENCY MEDICINE
Payer: MEDICARE

## 2022-08-10 ENCOUNTER — HOSPITAL ENCOUNTER (EMERGENCY)
Dept: GENERAL RADIOLOGY | Age: 82
Discharge: HOME | End: 2022-08-13
Payer: MEDICARE

## 2022-08-10 ENCOUNTER — HOSPITAL ENCOUNTER (EMERGENCY)
Dept: CT IMAGING | Age: 82
Discharge: HOME | End: 2022-08-13
Payer: MEDICARE

## 2022-08-10 VITALS
HEIGHT: 73 IN | HEART RATE: 82 BPM | RESPIRATION RATE: 18 BRPM | TEMPERATURE: 97.9 F | DIASTOLIC BLOOD PRESSURE: 88 MMHG | SYSTOLIC BLOOD PRESSURE: 149 MMHG | WEIGHT: 147 LBS | OXYGEN SATURATION: 100 % | BODY MASS INDEX: 19.48 KG/M2

## 2022-08-10 DIAGNOSIS — E87.6 HYPOKALEMIA: ICD-10-CM

## 2022-08-10 DIAGNOSIS — E86.0 DEHYDRATION: ICD-10-CM

## 2022-08-10 DIAGNOSIS — R41.0 CONFUSION: Primary | ICD-10-CM

## 2022-08-10 LAB
ALBUMIN SERPL-MCNC: 2.8 G/DL (ref 3.2–4.6)
ALBUMIN/GLOB SERPL: 0.6 {RATIO} (ref 1.2–3.5)
ALP SERPL-CCNC: 93 U/L (ref 50–136)
ALT SERPL-CCNC: 12 U/L (ref 12–65)
ANION GAP SERPL CALC-SCNC: 7 MMOL/L (ref 7–16)
APPEARANCE UR: CLEAR
AST SERPL-CCNC: 16 U/L (ref 15–37)
BACTERIA URNS QL MICRO: NEGATIVE /HPF
BASOPHILS # BLD: 0.1 K/UL (ref 0–0.2)
BASOPHILS NFR BLD: 1 % (ref 0–2)
BILIRUB SERPL-MCNC: 1 MG/DL (ref 0.2–1.1)
BILIRUB UR QL: NEGATIVE
BUN SERPL-MCNC: 23 MG/DL (ref 8–23)
CALCIUM SERPL-MCNC: 8.6 MG/DL (ref 8.3–10.4)
CASTS URNS QL MICRO: ABNORMAL /LPF
CHLORIDE SERPL-SCNC: 103 MMOL/L (ref 98–107)
CO2 SERPL-SCNC: 25 MMOL/L (ref 21–32)
COLOR UR: ABNORMAL
CREAT SERPL-MCNC: 0.85 MG/DL (ref 0.8–1.5)
DIFFERENTIAL METHOD BLD: ABNORMAL
EOSINOPHIL # BLD: 0.1 K/UL (ref 0–0.8)
EOSINOPHIL NFR BLD: 2 % (ref 0.5–7.8)
EPI CELLS #/AREA URNS HPF: ABNORMAL /HPF
ERYTHROCYTE [DISTWIDTH] IN BLOOD BY AUTOMATED COUNT: 15.6 % (ref 11.9–14.6)
GLOBULIN SER CALC-MCNC: 4.8 G/DL (ref 2.3–3.5)
GLUCOSE BLD STRIP.AUTO-MCNC: 83 MG/DL (ref 65–100)
GLUCOSE SERPL-MCNC: 80 MG/DL (ref 65–100)
GLUCOSE UR STRIP.AUTO-MCNC: NEGATIVE MG/DL
HCT VFR BLD AUTO: 40 % (ref 41.1–50.3)
HGB BLD-MCNC: 12.6 G/DL (ref 13.6–17.2)
HGB UR QL STRIP: NEGATIVE
IMM GRANULOCYTES # BLD AUTO: 0.4 K/UL (ref 0–0.5)
IMM GRANULOCYTES NFR BLD AUTO: 5 % (ref 0–5)
KETONES UR QL STRIP.AUTO: NEGATIVE MG/DL
LACTATE SERPL-SCNC: 2.1 MMOL/L (ref 0.4–2)
LACTATE SERPL-SCNC: 2.3 MMOL/L (ref 0.4–2)
LEUKOCYTE ESTERASE UR QL STRIP.AUTO: ABNORMAL
LYMPHOCYTES # BLD: 1.3 K/UL (ref 0.5–4.6)
LYMPHOCYTES NFR BLD: 17 % (ref 13–44)
MCH RBC QN AUTO: 26.8 PG (ref 26.1–32.9)
MCHC RBC AUTO-ENTMCNC: 31.5 G/DL (ref 31.4–35)
MCV RBC AUTO: 85.1 FL (ref 79.6–97.8)
MONOCYTES # BLD: 0.6 K/UL (ref 0.1–1.3)
MONOCYTES NFR BLD: 8 % (ref 4–12)
NEUTS SEG # BLD: 5.2 K/UL (ref 1.7–8.2)
NEUTS SEG NFR BLD: 68 % (ref 43–78)
NITRITE UR QL STRIP.AUTO: NEGATIVE
NRBC # BLD: 0 K/UL (ref 0–0.2)
PH UR STRIP: 5.5 [PH] (ref 5–9)
PLATELET # BLD AUTO: 238 K/UL (ref 150–450)
PMV BLD AUTO: 9.2 FL (ref 9.4–12.3)
POTASSIUM SERPL-SCNC: 3.2 MMOL/L (ref 3.5–5.1)
PROT SERPL-MCNC: 7.6 G/DL (ref 6.3–8.2)
PROT UR STRIP-MCNC: NEGATIVE MG/DL
RBC # BLD AUTO: 4.7 M/UL (ref 4.23–5.6)
RBC #/AREA URNS HPF: ABNORMAL /HPF
SERVICE CMNT-IMP: NORMAL
SODIUM SERPL-SCNC: 135 MMOL/L (ref 136–145)
SP GR UR REFRACTOMETRY: 1.01 (ref 1–1.02)
T4 FREE SERPL-MCNC: 1 NG/DL (ref 0.78–1.46)
TSH W FREE THYROID IF ABNORMAL: 3.8 UIU/ML (ref 0.36–3.74)
UROBILINOGEN UR QL STRIP.AUTO: 1 EU/DL (ref 0.2–1)
WBC # BLD AUTO: 7.6 K/UL (ref 4.3–11.1)
WBC URNS QL MICRO: ABNORMAL /HPF

## 2022-08-10 PROCEDURE — 84439 ASSAY OF FREE THYROXINE: CPT

## 2022-08-10 PROCEDURE — 71045 X-RAY EXAM CHEST 1 VIEW: CPT

## 2022-08-10 PROCEDURE — 93005 ELECTROCARDIOGRAM TRACING: CPT | Performed by: STUDENT IN AN ORGANIZED HEALTH CARE EDUCATION/TRAINING PROGRAM

## 2022-08-10 PROCEDURE — 70450 CT HEAD/BRAIN W/O DYE: CPT

## 2022-08-10 PROCEDURE — 84443 ASSAY THYROID STIM HORMONE: CPT

## 2022-08-10 PROCEDURE — 81001 URINALYSIS AUTO W/SCOPE: CPT

## 2022-08-10 PROCEDURE — 6370000000 HC RX 637 (ALT 250 FOR IP): Performed by: EMERGENCY MEDICINE

## 2022-08-10 PROCEDURE — 80053 COMPREHEN METABOLIC PANEL: CPT

## 2022-08-10 PROCEDURE — 99285 EMERGENCY DEPT VISIT HI MDM: CPT

## 2022-08-10 PROCEDURE — 85025 COMPLETE CBC W/AUTO DIFF WBC: CPT

## 2022-08-10 PROCEDURE — 83605 ASSAY OF LACTIC ACID: CPT

## 2022-08-10 PROCEDURE — 82962 GLUCOSE BLOOD TEST: CPT

## 2022-08-10 RX ORDER — POTASSIUM CHLORIDE 20 MEQ/1
40 TABLET, EXTENDED RELEASE ORAL ONCE
Status: COMPLETED | OUTPATIENT
Start: 2022-08-10 | End: 2022-08-10

## 2022-08-10 RX ORDER — 0.9 % SODIUM CHLORIDE 0.9 %
1000 INTRAVENOUS SOLUTION INTRAVENOUS ONCE
Status: DISCONTINUED | OUTPATIENT
Start: 2022-08-10 | End: 2022-08-10 | Stop reason: HOSPADM

## 2022-08-10 RX ADMIN — POTASSIUM CHLORIDE 40 MEQ: 1500 TABLET, EXTENDED RELEASE ORAL at 20:13

## 2022-08-10 ASSESSMENT — PAIN SCALES - GENERAL
PAINLEVEL_OUTOF10: 0
PAINLEVEL_OUTOF10: 0

## 2022-08-10 ASSESSMENT — ENCOUNTER SYMPTOMS
GASTROINTESTINAL NEGATIVE: 1
RESPIRATORY NEGATIVE: 1

## 2022-08-10 ASSESSMENT — PAIN - FUNCTIONAL ASSESSMENT
PAIN_FUNCTIONAL_ASSESSMENT: 0-10
PAIN_FUNCTIONAL_ASSESSMENT: 0-10

## 2022-08-10 NOTE — ED TRIAGE NOTES
Patient ambulatory to triage with ex-spouse. Patient was next door getting an infusion around 1330 and they stated that he was confused as to why he was there, stated he showed up at 0830 earlier that morning thinking his appointment was then and then seemed confused throughout his afternoon appointment. Patient able to answer questions in triage.

## 2022-08-10 NOTE — ED NOTES
Took report from Marcela, [facility]. Did vitals.  [id number] [facility] Thomas, RN  08/10/22 9013

## 2022-08-11 ENCOUNTER — CARE COORDINATION (OUTPATIENT)
Dept: CARE COORDINATION | Facility: CLINIC | Age: 82
End: 2022-08-11

## 2022-08-11 LAB
EKG ATRIAL RATE: 93 BPM
EKG DIAGNOSIS: NORMAL
EKG P AXIS: 70 DEGREES
EKG P-R INTERVAL: 238 MS
EKG Q-T INTERVAL: 382 MS
EKG QRS DURATION: 96 MS
EKG QTC CALCULATION (BAZETT): 474 MS
EKG R AXIS: 25 DEGREES
EKG T AXIS: 62 DEGREES
EKG VENTRICULAR RATE: 93 BPM

## 2022-08-11 NOTE — CARE COORDINATION
Ambulatory Care Coordination Note  8/11/2022    ACC: Dwain , RN    Summary Note: Spoke w/ pt regarding ED SHIRA. Pt states he went to have breakfast this morning and fell asleep in his car upon his return home. RNCM attempted to update contact information as his emergency contact listed was his sister whom he states has passed away, this name removed. Other contact who is friend lives out of state. Pt attempted to look up his exwife's contact information and phone was disconnected. Pt called RNCM back and provided a number for his exwife however when RNCM called it, it was the wrong number. Attempted to review pt medications, pt stated he was not on any medication. When I asked him to look again he stated he was suppose to be taking Prednisone but hadn't taken it today. He read off instructions on how to take and stated he would take one now and one this evening. RNCM called [address] regarding KCL, 15d supply on 6/21/22. Prescription has not been refilled. RNCM called [employer] to request wellcheck d/t pt's continued confusion, falling sleep in his car. Information provided to medic who will do check. RNCM spoke w/ pt again and stated EMS had called him, and he informed them he was on his porch and doing fine. Discussed upcoming visit w/ PCP, confirmed date and time w/ pt. He has not children. Pt provided number for his exwife Jamie Neighbor, [phone]. RNCM called and left vm w/ contact information requesting call back. RNCM will continue to follow in CCM services. Pt has RNCM contact information. Prior to Admission medications    Medication Sig Start Date End Date Taking? Authorizing Provider   predniSONE (DELTASONE) 5 MG tablet Take 1 tablet by mouth in the morning and 1 tablet before bedtime.  7/28/22 8/27/22  Misa Decker,    potassium chloride (KLOR-CON M) 20 MEQ extended release tablet Take 1 tablet by mouth 3 times daily  Patient not taking: Reported on 7/27/2022 6/21/22   My Le DO   escitalopram (LEXAPRO) 10 MG tablet Take 1 tablet by mouth daily  Patient not taking: Reported on 7/27/2022 6/20/22   My Le DO   cyanocobalamin 1000 MCG/ML injection Inject 1,000 mcg into the muscle every 30 days  11/16/21   Ar Automatic Reconciliation       Future Appointments   Date Time Provider rTung Sorensen   8/26/2022  9:45 AM My Le DO [facility]      and   General Assessment

## 2022-08-11 NOTE — ED PROVIDER NOTES
[facility] Emergency Department Provider Note                   PCP:                Lacey Galvan DO               Age: 78 y.o. Sex: male       ICD-10-CM    1. Confusion  R41.0       2. Dehydration  E86.0       3. Hypokalemia  E87.6           DISPOSITION Decision To Discharge 08/10/2022 09:00:48 PM       MDM  Number of Diagnoses or Management Options  Confusion  Dehydration  Hypokalemia  Diagnosis management comments: 75-year-old male present emergency department with intermittent confusion. Patient initially did not know the date nor the president was but patient cleared mentally while in the emergency department. Patient was slightly dehydrated and hypokalemic. Patient was given IV fluids and potassium replacement. Patient discharged home and return to emergency department for any concerns. He will follow-up with his family doctor.        Amount and/or Complexity of Data Reviewed  Clinical lab tests: ordered and reviewed  Tests in the radiology section of CPT®: ordered and reviewed  Decide to obtain previous medical records or to obtain history from someone other than the patient: yes  Obtain history from someone other than the patient: yes  Review and summarize past medical records: yes  Independent visualization of images, tracings, or specimens: yes    Patient Progress  Patient progress: improved       Orders Placed This Encounter   Procedures    XR CHEST PORTABLE    CT Head W/O Contrast    CBC with Auto Differential    CMP    Lactic Acid Now and in 2 Hours    TSH with Reflex    T4, Free    Urinalysis w rflx microscopic    POCT Urine Dipstick    Pulse Oximetry    POCT Glucose    POCT Glucose    EKG 12 Lead    Saline lock IV        Lizy Long is a 78 y.o. male who presents to the Emergency Department with chief complaint of    Chief Complaint   Patient presents with    Altered Mental Status     Confusion per GI doctor      75-year-old  male presented to the emergency department for evaluation for intermittent confusion. Patient was at his monthly Remicade infusion for ulcerative colitis when he was noted to be intermittently confused. Per patient's ex-wife, patient has been intermittently confused and this has been increasing over the past year. When patient was initially triaged, he was unsure what the date was or who the president was however he has now alert and oriented. Patient states that he feels okay and has no complaints currently. He denies any fevers, chills, chest pain, shortness of breath, nausea, vomiting, diarrhea, changes in bowel or bladder habits or other concerns. The history is provided by the patient and the spouse. All other systems reviewed and are negative. Review of Systems   Constitutional: Negative. HENT: Negative. Respiratory: Negative. Cardiovascular: Negative. Gastrointestinal: Negative. Genitourinary: Negative. Musculoskeletal: Negative. Neurological: Negative. Hematological: Negative. Psychiatric/Behavioral:  Positive for confusion. All other systems reviewed and are negative. Past Medical History:   Diagnosis Date    Abnormal cardiovascular function study     Aortic valve regurgitation 01/04/2016    Echo:LV EF=55%. Normal LV size. Mild to moderate AI with mildly dilated aortic root (3.8 cm) and aortic sclerosis. Bilateral cataracts     Colon polyps     COPD (chronic obstructive pulmonary disease) (HCC)     Coronary atherosclerosis of native coronary vessel 02/12/2016    Coronary clcifications noted on chest CT scan on 12-4-2015. Normal myocardial perfusion scan (normal perfusion and EF=76%) on 2-12-16.     Elevated hemoglobin A1c measurement 1/13/2017    Hyperlipidemia 9/6/2016        Past Surgical History:   Procedure Laterality Date    CATARACT REMOVAL      bilat    COLONOSCOPY      2015    POLYPECTOMY      Colon        Family History   Problem Relation Age of Onset    Cancer Father         Colon Hypertension Mother         Social History     Socioeconomic History    Marital status:    Tobacco Use    Smoking status: Former     Types: Cigarettes     Quit date: 1990     Years since quittin.7    Smokeless tobacco: Never   Substance and Sexual Activity    Alcohol use: No     Alcohol/week: 0.0 standard drinks    Drug use: No        Allergies: Erythromycin and Sulfa antibiotics    Discharge Medication List as of 8/10/2022  9:01 PM        CONTINUE these medications which have NOT CHANGED    Details   predniSONE (DELTASONE) 5 MG tablet Take 1 tablet by mouth in the morning and 1 tablet before bedtime. , Disp-60 tablet, R-0Normal      potassium chloride (KLOR-CON M) 20 MEQ extended release tablet Take 1 tablet by mouth 3 times daily, Disp-45 tablet, R-1Normal      escitalopram (LEXAPRO) 10 MG tablet Take 1 tablet by mouth daily, Disp-30 tablet, R-1Normal      cyanocobalamin 1000 MCG/ML injection Inject 1,000 mcg into the muscle every 30 days Historical Med              Vitals signs and nursing note reviewed. Patient Vitals for the past 4 hrs:   Pulse Resp BP SpO2   08/10/22 2108 82 -- (!) 149/88 100 %   08/10/22 1908 85 18 (!) 155/89 100 %   08/10/22 1821 86 16 (!) 156/88 96 %          Physical Exam  Vitals and nursing note reviewed. Constitutional:       General: He is not in acute distress. Appearance: He is well-developed. He is not ill-appearing or toxic-appearing. HENT:      Head: Normocephalic and atraumatic. Mouth/Throat:      Comments: Mildly dry mucous membranes  Eyes:      Extraocular Movements: Extraocular movements intact. Pupils: Pupils are equal, round, and reactive to light. Cardiovascular:      Rate and Rhythm: Normal rate and regular rhythm. Heart sounds: Normal heart sounds. Pulmonary:      Effort: Pulmonary effort is normal.      Breath sounds: Normal breath sounds. Abdominal:      General: There is no distension. Palpations: Abdomen is soft. Tenderness: There is no abdominal tenderness. Musculoskeletal:         General: Normal range of motion. Cervical back: Normal range of motion. Skin:     General: Skin is warm and dry. Neurological:      Mental Status: He is alert and oriented to person, place, and time. Mental status is at baseline. GCS: GCS eye subscore is 4. GCS verbal subscore is 5. GCS motor subscore is 6. Psychiatric:         Mood and Affect: Mood normal.         Speech: Speech normal.         Behavior: Behavior normal.        ED EKG Interpretation  EKG was interpreted in the absence of a cardiologist.    Rate: Rate 93  EKG Interpretation: Sinus rhythm with first-degree AV block. Incomplete right bundle branch block. Nonspecific ST and T wave abnormalities. Normal QT.   ST Segments: Nonspecific ST segments - NO STEMI    Labs Reviewed   CBC WITH AUTO DIFFERENTIAL - Abnormal; Notable for the following components:       Result Value    Hemoglobin 12.6 (*)     Hematocrit 40.0 (*)     RDW 15.6 (*)     MPV 9.2 (*)     All other components within normal limits   COMPREHENSIVE METABOLIC PANEL - Abnormal; Notable for the following components:    Sodium 135 (*)     Potassium 3.2 (*)     Albumin 2.8 (*)     Globulin 4.8 (*)     Albumin/Globulin Ratio 0.6 (*)     All other components within normal limits   LACTIC ACID - Abnormal; Notable for the following components:    Lactic Acid, Plasma 2.1 (*)     All other components within normal limits   LACTIC ACID - Abnormal; Notable for the following components:    Lactic Acid, Plasma 2.3 (*)     All other components within normal limits   TSH WITH REFLEX - Abnormal; Notable for the following components:    TSH w Free Thyroid if Abnormal 3.80 (*)     All other components within normal limits   URINALYSIS - Abnormal; Notable for the following components:    Leukocyte Esterase, Urine TRACE (*)     All other components within normal limits   T4, FREE   POCT GLUCOSE   POCT GLUCOSE        CT Head W/O Contrast   Final Result   No acute intracranial abnormality evident by CT. XR CHEST PORTABLE   Final Result   New opacities in the right costophrenic angle, could reflect trace effusion and   airspace disease such as atelectasis or infiltrate. ED Course as of 08/10/22 2201   Wed Aug 10, 2022   2100 Patient's lactic acid is essentially unchanged. Patient had not received his fluids. Patient received his fluids after the lactic acid was redrawn. Patient feels better and will be discharged home. [JL]      ED Course User Index  [JL] Queen Justine MD        Voice dictation software was used during the making of this note. This software is not perfect and grammatical and other typographical errors may be present. This note has not been completely proofread for errors.        Queen Justine MD  08/10/22 2209

## 2022-08-11 NOTE — DISCHARGE INSTRUCTIONS
Follow-up with your doctor this week. Return to the emergency department for any concerns. Plenty of fluids.

## 2022-08-15 ENCOUNTER — OFFICE VISIT (OUTPATIENT)
Dept: INTERNAL MEDICINE CLINIC | Facility: CLINIC | Age: 82
End: 2022-08-15
Payer: MEDICARE

## 2022-08-15 VITALS
BODY MASS INDEX: 19.48 KG/M2 | OXYGEN SATURATION: 92 % | HEIGHT: 73 IN | HEART RATE: 92 BPM | DIASTOLIC BLOOD PRESSURE: 78 MMHG | WEIGHT: 147 LBS | SYSTOLIC BLOOD PRESSURE: 140 MMHG

## 2022-08-15 DIAGNOSIS — M62.81 MUSCLE WEAKNESS (GENERALIZED): Primary | ICD-10-CM

## 2022-08-15 DIAGNOSIS — M35.3 PMR (POLYMYALGIA RHEUMATICA) (HCC): ICD-10-CM

## 2022-08-15 DIAGNOSIS — R53.83 FATIGUE, UNSPECIFIED TYPE: ICD-10-CM

## 2022-08-15 DIAGNOSIS — F03.90 DEMENTIA WITHOUT BEHAVIORAL DISTURBANCE, UNSPECIFIED DEMENTIA TYPE: ICD-10-CM

## 2022-08-15 PROCEDURE — G8427 DOCREV CUR MEDS BY ELIG CLIN: HCPCS | Performed by: INTERNAL MEDICINE

## 2022-08-15 PROCEDURE — G8420 CALC BMI NORM PARAMETERS: HCPCS | Performed by: INTERNAL MEDICINE

## 2022-08-15 PROCEDURE — 99214 OFFICE O/P EST MOD 30 MIN: CPT | Performed by: INTERNAL MEDICINE

## 2022-08-15 PROCEDURE — 1123F ACP DISCUSS/DSCN MKR DOCD: CPT | Performed by: INTERNAL MEDICINE

## 2022-08-15 PROCEDURE — 1036F TOBACCO NON-USER: CPT | Performed by: INTERNAL MEDICINE

## 2022-08-15 RX ORDER — PREDNISONE 1 MG/1
5 TABLET ORAL 2 TIMES DAILY
Qty: 30 TABLET | Refills: 2 | Status: SHIPPED | OUTPATIENT
Start: 2022-08-15 | End: 2022-08-15 | Stop reason: ALTCHOICE

## 2022-08-15 RX ORDER — PREDNISONE 1 MG/1
5 TABLET ORAL 2 TIMES DAILY
Qty: 60 TABLET | Refills: 2 | Status: SHIPPED | OUTPATIENT
Start: 2022-08-15 | End: 2022-09-14

## 2022-08-15 ASSESSMENT — PATIENT HEALTH QUESTIONNAIRE - PHQ9
SUM OF ALL RESPONSES TO PHQ QUESTIONS 1-9: 0
9. THOUGHTS THAT YOU WOULD BE BETTER OFF DEAD, OR OF HURTING YOURSELF: 0
2. FEELING DOWN, DEPRESSED OR HOPELESS: 0
SUM OF ALL RESPONSES TO PHQ9 QUESTIONS 1 & 2: 0
3. TROUBLE FALLING OR STAYING ASLEEP: 0
8. MOVING OR SPEAKING SO SLOWLY THAT OTHER PEOPLE COULD HAVE NOTICED. OR THE OPPOSITE, BEING SO FIGETY OR RESTLESS THAT YOU HAVE BEEN MOVING AROUND A LOT MORE THAN USUAL: 0
7. TROUBLE CONCENTRATING ON THINGS, SUCH AS READING THE NEWSPAPER OR WATCHING TELEVISION: 0
1. LITTLE INTEREST OR PLEASURE IN DOING THINGS: 0
6. FEELING BAD ABOUT YOURSELF - OR THAT YOU ARE A FAILURE OR HAVE LET YOURSELF OR YOUR FAMILY DOWN: 0
4. FEELING TIRED OR HAVING LITTLE ENERGY: 0
5. POOR APPETITE OR OVEREATING: 0
SUM OF ALL RESPONSES TO PHQ QUESTIONS 1-9: 0
10. IF YOU CHECKED OFF ANY PROBLEMS, HOW DIFFICULT HAVE THESE PROBLEMS MADE IT FOR YOU TO DO YOUR WORK, TAKE CARE OF THINGS AT HOME, OR GET ALONG WITH OTHER PEOPLE: 0
SUM OF ALL RESPONSES TO PHQ QUESTIONS 1-9: 0
SUM OF ALL RESPONSES TO PHQ QUESTIONS 1-9: 0

## 2022-08-15 ASSESSMENT — ENCOUNTER SYMPTOMS
CHEST TIGHTNESS: 0
SHORTNESS OF BREATH: 0

## 2022-08-15 NOTE — PROGRESS NOTES
Min Reyes was seen today for follow-up. Diagnoses and all orders for this visit:    Muscle weakness (generalized)  Comments:  sed rate up some c symptoms assume pmr .has improved with prednisone. Seems some dementia , try physical therapy-encouraged attend    Fatigue, unspecified type  Comments:  aelxis. was having trouble walking around house now says he is working towards going to work garden    PMR (polymyalgia rheumatica) ([facility] [address]  Comments:  seems better on prednisone ,weight up better. Orders:  -     Sedimentation Rate; Future  -     C-Reactive Protein; Future  -     Comprehensive Metabolic Panel; Future  -     CBC; Future    Dementia without behavioral disturbance, unspecified dementia type ([facility] [address]  Comments:  likely , watch    Other orders  -     predniSONE (DELTASONE) 5 MG tablet; Take 1 tablet by mouth in the morning and 1 tablet before bedtime. Patt Bravo is a 78 y.o. male    Chief Complaint   Patient presents with    Follow-up     Low potassium         No results found for this visit on 08/15/22. Past Medical History:   Diagnosis Date    Abnormal cardiovascular function study     Aortic valve regurgitation 01/04/2016    Echo:LV EF=55%. Normal LV size. Mild to moderate AI with mildly dilated aortic root (3.8 cm) and aortic sclerosis. Bilateral cataracts     Colon polyps     COPD (chronic obstructive pulmonary disease) (HCC)     Coronary atherosclerosis of native coronary vessel 02/12/2016    Coronary clcifications noted on chest CT scan on 12-4-2015. Normal myocardial perfusion scan (normal perfusion and EF=76%) on 2-12-16.     Elevated hemoglobin A1c measurement 1/13/2017    Hyperlipidemia 9/6/2016       Family History   Problem Relation Age of Onset    Cancer Father         Colon    Hypertension Mother         Social History     Socioeconomic History    Marital status:      Spouse name: Not on file    Number of children: Not on file    Years of education: Not on file Highest education level: Not on file   Occupational History    Not on file   Tobacco Use    Smoking status: Former     Types: Cigarettes     Quit date: 1990     Years since quittin.7    Smokeless tobacco: Never   Substance and Sexual Activity    Alcohol use: No     Alcohol/week: 0.0 standard drinks    Drug use: No    Sexual activity: Not on file   Other Topics Concern    Not on file   Social History Narrative    Not on file     Social Determinants of Health     Financial Resource Strain: Not on file   Food Insecurity: Not on file   Transportation Needs: Not on file   Physical Activity: Not on file   Stress: Not on file   Social Connections: Not on file   Intimate Partner Violence: Not on file   Housing Stability: Not on file         Current Outpatient Medications:     predniSONE (DELTASONE) 5 MG tablet, Take 1 tablet by mouth in the morning and 1 tablet before bedtime. , Disp: 30 tablet, Rfl: 2    cyanocobalamin 1000 MCG/ML injection, Inject 1,000 mcg into the muscle every 30 days , Disp: , Rfl:     potassium chloride (KLOR-CON M) 20 MEQ extended release tablet, Take 1 tablet by mouth 3 times daily (Patient not taking: No sig reported), Disp: 45 tablet, Rfl: 1    escitalopram (LEXAPRO) 10 MG tablet, Take 1 tablet by mouth daily (Patient not taking: No sig reported), Disp: 30 tablet, Rfl: 1    Allergies   Allergen Reactions    Erythromycin Rash    Sulfa Antibiotics Rash         Review of Systems   Constitutional:  Positive for fatigue. Negative for unexpected weight change. Respiratory:  Negative for chest tightness and shortness of breath. Genitourinary:  Negative for dysuria. Neurological:  Positive for weakness.    Psychiatric/Behavioral:          Memory       Vitals:    08/15/22 1457 08/15/22 1502   BP: (!) 150/80 (!) 140/78   Pulse: 92    SpO2: 92%    Weight: 147 lb (66.7 kg)    Height: 6' 1\" (1.854 m)            Physical Exam  Neurological:      Gait: Gait abnormal.          Micheal Ortiz was seen today for follow-up. Diagnoses and all orders for this visit:    Muscle weakness (generalized)  Comments:  sed rate up some c symptoms assume pmr .has improved with prednisone. Seems some dementia , try physical therapy-encouraged attend    Fatigue, unspecified type  Comments:  mandyalba. was having trouble walking around house now says he is working towards going to work garden    PMR (polymyalgia rheumatica) ([facility] [address]  Comments:  seems better on prednisone ,weight up better. Orders:  -     Sedimentation Rate; Future  -     C-Reactive Protein; Future  -     Comprehensive Metabolic Panel; Future  -     CBC; Future    Dementia without behavioral disturbance, unspecified dementia type ([facility] [address]  Comments:  likely , watch    Other orders  -     predniSONE (DELTASONE) 5 MG tablet; Take 1 tablet by mouth in the morning and 1 tablet before bedtime.              Overall seems better  Elvira Stuart, DO

## 2022-08-18 ENCOUNTER — CARE COORDINATION (OUTPATIENT)
Dept: CARE COORDINATION | Facility: CLINIC | Age: 82
End: 2022-08-18

## 2022-08-26 ENCOUNTER — CARE COORDINATION (OUTPATIENT)
Dept: CARE COORDINATION | Facility: CLINIC | Age: 82
End: 2022-08-26

## 2022-09-20 ENCOUNTER — CARE COORDINATION (OUTPATIENT)
Dept: CARE COORDINATION | Facility: CLINIC | Age: 82
End: 2022-09-20

## 2022-09-20 NOTE — CARE COORDINATION
RNCM 2nd unsuccessful attempt to reach pt, no answer and vm is full. Will close episode as pt is unable to be reached.

## 2022-10-20 ENCOUNTER — OFFICE VISIT (OUTPATIENT)
Dept: INTERNAL MEDICINE CLINIC | Facility: CLINIC | Age: 82
End: 2022-10-20
Payer: MEDICARE

## 2022-10-20 VITALS — OXYGEN SATURATION: 96 % | HEART RATE: 88 BPM | DIASTOLIC BLOOD PRESSURE: 66 MMHG | SYSTOLIC BLOOD PRESSURE: 138 MMHG

## 2022-10-20 DIAGNOSIS — G30.9 ALZHEIMER'S DISEASE, UNSPECIFIED (CODE) (HCC): ICD-10-CM

## 2022-10-20 DIAGNOSIS — E53.8 VITAMIN B 12 DEFICIENCY: Primary | ICD-10-CM

## 2022-10-20 DIAGNOSIS — I10 ESSENTIAL HYPERTENSION: ICD-10-CM

## 2022-10-20 PROCEDURE — G8427 DOCREV CUR MEDS BY ELIG CLIN: HCPCS | Performed by: INTERNAL MEDICINE

## 2022-10-20 PROCEDURE — 1123F ACP DISCUSS/DSCN MKR DOCD: CPT | Performed by: INTERNAL MEDICINE

## 2022-10-20 PROCEDURE — 3074F SYST BP LT 130 MM HG: CPT | Performed by: INTERNAL MEDICINE

## 2022-10-20 PROCEDURE — 3078F DIAST BP <80 MM HG: CPT | Performed by: INTERNAL MEDICINE

## 2022-10-20 PROCEDURE — 1036F TOBACCO NON-USER: CPT | Performed by: INTERNAL MEDICINE

## 2022-10-20 PROCEDURE — G8484 FLU IMMUNIZE NO ADMIN: HCPCS | Performed by: INTERNAL MEDICINE

## 2022-10-20 PROCEDURE — G8420 CALC BMI NORM PARAMETERS: HCPCS | Performed by: INTERNAL MEDICINE

## 2022-10-20 PROCEDURE — 99214 OFFICE O/P EST MOD 30 MIN: CPT | Performed by: INTERNAL MEDICINE

## 2022-10-20 PROCEDURE — 96372 THER/PROPH/DIAG INJ SC/IM: CPT | Performed by: INTERNAL MEDICINE

## 2022-10-20 RX ORDER — CYANOCOBALAMIN 1000 UG/ML
1000 INJECTION, SOLUTION INTRAMUSCULAR; SUBCUTANEOUS
Status: SHIPPED | OUTPATIENT
Start: 2022-10-20

## 2022-10-20 RX ORDER — ACETAMINOPHEN 325 MG/1
650 TABLET ORAL EVERY 8 HOURS PRN
COMMUNITY

## 2022-10-20 RX ORDER — METOPROLOL TARTRATE 50 MG/1
TABLET, FILM COATED ORAL
COMMUNITY
Start: 2022-10-10

## 2022-10-20 RX ORDER — FERROUS SULFATE 325(65) MG
325 TABLET ORAL
COMMUNITY
Start: 2022-09-22 | End: 2022-10-22

## 2022-10-20 RX ORDER — DONEPEZIL HYDROCHLORIDE 5 MG/1
5 TABLET, FILM COATED ORAL NIGHTLY
Qty: 30 TABLET | Refills: 5 | Status: SHIPPED | OUTPATIENT
Start: 2022-10-20

## 2022-10-20 RX ADMIN — CYANOCOBALAMIN 1000 MCG: 1000 INJECTION, SOLUTION INTRAMUSCULAR; SUBCUTANEOUS at 15:10

## 2022-10-20 ASSESSMENT — ENCOUNTER SYMPTOMS
SHORTNESS OF BREATH: 0
SINUS PAIN: 0
CONSTIPATION: 0
WHEEZING: 0
DIARRHEA: 0
VOMITING: 0
NAUSEA: 0
ABDOMINAL PAIN: 0

## 2022-10-20 NOTE — PROGRESS NOTES
Yg Moore was seen today for follow-up and memory loss. Diagnoses and all orders for this visit:    Vitamin B 12 deficiency  -     cyanocobalamin injection 1,000 mcg    Alzheimer's disease, unspecified  -     donepezil (ARICEPT) 5 MG tablet;  Take 1 tablet by mouth nightly    Essential hypertension        Refugio Henning is a 78 y.o. male    Chief Complaint   Patient presents with    Follow-up    Memory Loss     Start Rx for memory   [facility] admit pneumonia  Getting some help accompany by friend from [facility]      Admission on 08/10/2022, Discharged on 08/10/2022   Component Date Value Ref Range Status    WBC 08/10/2022 7.6  4.3 - 11.1 K/uL Final    RBC 08/10/2022 4.70  4.23 - 5.6 M/uL Final    Hemoglobin 08/10/2022 12.6 (A)  13.6 - 17.2 g/dL Final    Hematocrit 08/10/2022 40.0 (A)  41.1 - 50.3 % Final    MCV 08/10/2022 85.1  79.6 - 97.8 FL Final    MCH 08/10/2022 26.8  26.1 - 32.9 PG Final    MCHC 08/10/2022 31.5  31.4 - 35.0 g/dL Final    RDW 08/10/2022 15.6 (A)  11.9 - 14.6 % Final    Platelets 74/17/4809 238  150 - 450 K/uL Final    MPV 08/10/2022 9.2 (A)  9.4 - 12.3 FL Final    nRBC 08/10/2022 0.00  0.0 - 0.2 K/uL Final    **Note: Absolute NRBC parameter is now reported with Hemogram**    Differential Type 08/10/2022 AUTOMATED    Final    Seg Neutrophils 08/10/2022 68  43 - 78 % Final    Lymphocytes 08/10/2022 17  13 - 44 % Final    Monocytes 08/10/2022 8  4.0 - 12.0 % Final    Eosinophils % 08/10/2022 2  0.5 - 7.8 % Final    Basophils 08/10/2022 1  0.0 - 2.0 % Final    Immature Granulocytes 08/10/2022 5  0.0 - 5.0 % Final    Segs Absolute 08/10/2022 5.2  1.7 - 8.2 K/UL Final    Absolute Lymph # 08/10/2022 1.3  0.5 - 4.6 K/UL Final    Absolute Mono # 08/10/2022 0.6  0.1 - 1.3 K/UL Final    Absolute Eos # 08/10/2022 0.1  0.0 - 0.8 K/UL Final    Basophils Absolute 08/10/2022 0.1  0.0 - 0.2 K/UL Final    Absolute Immature Granulocyte 08/10/2022 0.4  0.0 - 0.5 K/UL Final    Sodium 08/10/2022 135 (A) 136 - 145 mmol/L Final    Potassium 08/10/2022 3.2 (A)  3.5 - 5.1 mmol/L Final    Chloride 08/10/2022 103  98 - 107 mmol/L Final    CO2 08/10/2022 25  21 - 32 mmol/L Final    Anion Gap 08/10/2022 7  7 - 16 mmol/L Final    Glucose 08/10/2022 80  65 - 100 mg/dL Final    BUN 08/10/2022 23  8 - 23 MG/DL Final    Creatinine 08/10/2022 0.85  0.8 - 1.5 MG/DL Final    GFR  08/10/2022 >60  >60 ml/min/1.73m2 Final    GFR Non- 08/10/2022 >60  >60 ml/min/1.73m2 Final    Comment:      Estimated GFR is calculated using the Modification of Diet in Renal Disease (MDRD) Study equation, reported for both  Americans (GFRAA) and non- Americans (GFRNA), and normalized to 1.73m2 body surface area. The physician must decide which value applies to the patient. The MDRD study equation should only be used in individuals age 25 or older. It has not been validated for the following: pregnant women, patients with serious comorbid conditions,or on certain medications, or persons with extremes of body size, muscle mass, or nutritional status.       Calcium 08/10/2022 8.6  8.3 - 10.4 MG/DL Final    Total Bilirubin 08/10/2022 1.0  0.2 - 1.1 MG/DL Final    ALT 08/10/2022 12  12 - 65 U/L Final    AST 08/10/2022 16  15 - 37 U/L Final    Alk Phosphatase 08/10/2022 93  50 - 136 U/L Final    Total Protein 08/10/2022 7.6  6.3 - 8.2 g/dL Final    Albumin 08/10/2022 2.8 (A)  3.2 - 4.6 g/dL Final    Globulin 08/10/2022 4.8 (A)  2.3 - 3.5 g/dL Final    Albumin/Globulin Ratio 08/10/2022 0.6 (A)  1.2 - 3.5   Final    Ventricular Rate 08/10/2022 93  BPM Final    Atrial Rate 08/10/2022 93  BPM Final    P-R Interval 08/10/2022 238  ms Final    QRS Duration 08/10/2022 96  ms Final    Q-T Interval 08/10/2022 382  ms Final    QTc Calculation (Bazett) 08/10/2022 474  ms Final    P Axis 08/10/2022 70  degrees Final    R Axis 08/10/2022 25  degrees Final    T Axis 08/10/2022 62  degrees Final    Diagnosis 08/10/2022 Sinus rhythm   Final    Lactic Acid, Plasma 08/10/2022 2.1 (A)  0.4 - 2.0 MMOL/L Final    Lactic Acid, Plasma 08/10/2022 2.3 (A)  0.4 - 2.0 MMOL/L Final    TSH w Free Thyroid if Abnormal 08/10/2022 3.80 (A)  0.358 - 3.740 UIU/ML Final    POC Glucose 08/10/2022 83  65 - 100 mg/dL Final    Comment: 47 - 60 mg/dl Consistent with, but not fully diagnostic of hypoglycemia. 101 - 125 mg/dl Impaired fasting glucose/consistent with pre-diabetes mellitus  > 126 mg/dl Fasting glucose consistent with overt diabetes mellitus      Performed by: 08/10/2022 Rosibel   Final    T4 Free 08/10/2022 1.0  0.78 - 1.46 NG/DL Final    Color, UA 08/10/2022 YELLOW/STRAW    Final    Color Reference Range: Straw, Yellow or Dark Yellow    Appearance 08/10/2022 CLEAR    Final    Specific Gravity, UA 08/10/2022 1.014  1.001 - 1.023   Final    pH, Urine 08/10/2022 5.5  5.0 - 9.0   Final    Protein, UA 08/10/2022 Negative  NEG mg/dL Final    Glucose, UA 08/10/2022 Negative  mg/dL Final    Ketones, Urine 08/10/2022 Negative  NEG mg/dL Final    Bilirubin Urine 08/10/2022 Negative  NEG   Final    Blood, Urine 08/10/2022 Negative  NEG   Final    Urobilinogen, Urine 08/10/2022 1.0  0.2 - 1.0 EU/dL Final    Nitrite, Urine 08/10/2022 Negative  NEG   Final    Leukocyte Esterase, Urine 08/10/2022 TRACE (A)  NEG   Final    WBC, UA 08/10/2022 0-4  0 /hpf Final    RBC, UA 08/10/2022 0-5  0 /hpf Final    Epithelial Cells UA 08/10/2022 0-5  0 /hpf Final    BACTERIA, URINE 08/10/2022 Negative  0 /hpf Final    Casts 08/10/2022 0-2  0 /lpf Final        Past Medical History:   Diagnosis Date    Abnormal cardiovascular function study     Aortic valve regurgitation 01/04/2016    Echo:LV EF=55%. Normal LV size. Mild to moderate AI with mildly dilated aortic root (3.8 cm) and aortic sclerosis.     Bilateral cataracts     Colon polyps     COPD (chronic obstructive pulmonary disease) (HCC)     Coronary atherosclerosis of native coronary vessel 02/12/2016    Coronary clcifications noted on chest CT scan on 2015. Normal myocardial perfusion scan (normal perfusion and EF=76%) on 16. Elevated hemoglobin A1c measurement 2017    Hyperlipidemia 2016       Family History   Problem Relation Age of Onset    Cancer Father         Colon    Hypertension Mother         Social History     Socioeconomic History    Marital status:      Spouse name: Not on file    Number of children: Not on file    Years of education: Not on file    Highest education level: Not on file   Occupational History    Not on file   Tobacco Use    Smoking status: Former     Types: Cigarettes     Quit date: 1990     Years since quittin.9    Smokeless tobacco: Never   Substance and Sexual Activity    Alcohol use: No     Alcohol/week: 0.0 standard drinks    Drug use: No    Sexual activity: Not on file   Other Topics Concern    Not on file   Social History Narrative    Not on file     Social Determinants of Health     Financial Resource Strain: Not on file   Food Insecurity: Not on file   Transportation Needs: Not on file   Physical Activity: Not on file   Stress: Not on file   Social Connections: Not on file   Intimate Partner Violence: Not on file   Housing Stability: Not on file         Current Outpatient Medications:     metoprolol tartrate (LOPRESSOR) 50 MG tablet, TAKE 1 TABLET BY MOUTH TWICE DAILY, Disp: , Rfl:     acetaminophen (TYLENOL) 325 MG tablet, 650 mg every 8 hours as needed, Disp: , Rfl:     donepezil (ARICEPT) 5 MG tablet, Take 1 tablet by mouth nightly, Disp: 30 tablet, Rfl: 5    cyanocobalamin 1000 MCG/ML injection, Inject 1,000 mcg into the muscle every 30 days , Disp: , Rfl:     Allergies   Allergen Reactions    Erythromycin Rash    Sulfa Antibiotics Rash         Review of Systems   Constitutional:  Negative for appetite change, chills, fatigue and fever. HENT:  Negative for sinus pain. Respiratory:  Negative for shortness of breath and wheezing. Cardiovascular:  Negative for chest pain. Gastrointestinal:  Negative for abdominal pain, constipation, diarrhea, nausea and vomiting. Genitourinary:  Negative for dysuria and frequency. Musculoskeletal:  Negative for myalgias. Neurological:  Negative for dizziness. Psychiatric/Behavioral:  Negative for suicidal ideas. All other systems reviewed and are negative. Vitals:    10/20/22 1456   BP: 138/66   Pulse: 88   SpO2: 96%           Physical Exam  Constitutional:       Appearance: He is obese. He is not ill-appearing. Cardiovascular:      Rate and Rhythm: Normal rate. Pulmonary:      Effort: Pulmonary effort is normal.      Breath sounds: Normal breath sounds. Abdominal:      General: There is no distension. Musculoskeletal:      Cervical back: Normal range of motion and neck supple. Right lower leg: No edema. Left lower leg: No edema. Skin:     Coloration: Skin is not jaundiced. Neurological:      General: No focal deficit present. Mental Status: He is alert. Mental status is at baseline. Psychiatric:         Mood and Affect: Mood normal.         Thought Content: Thought content normal.          Heriberto Denton was seen today for follow-up and memory loss. Diagnoses and all orders for this visit:    Vitamin B 12 deficiency  -     cyanocobalamin injection 1,000 mcg    Alzheimer's disease, unspecified  -     donepezil (ARICEPT) 5 MG tablet;  Take 1 tablet by mouth nightly    Essential hypertension               Marta Marks DO

## 2022-11-21 ENCOUNTER — OFFICE VISIT (OUTPATIENT)
Dept: INTERNAL MEDICINE CLINIC | Facility: CLINIC | Age: 82
End: 2022-11-21
Payer: MEDICARE

## 2022-11-21 ENCOUNTER — HOME HEALTH ADMISSION (OUTPATIENT)
Dept: HOME HEALTH SERVICES | Facility: HOME HEALTH | Age: 82
End: 2022-11-21

## 2022-11-21 VITALS
BODY MASS INDEX: 19.37 KG/M2 | OXYGEN SATURATION: 97 % | DIASTOLIC BLOOD PRESSURE: 60 MMHG | WEIGHT: 146.8 LBS | HEART RATE: 107 BPM | SYSTOLIC BLOOD PRESSURE: 110 MMHG

## 2022-11-21 DIAGNOSIS — E87.1 HYPONATREMIA: ICD-10-CM

## 2022-11-21 DIAGNOSIS — L89.152 PRESSURE INJURY OF SACRAL REGION, STAGE 2 (HCC): ICD-10-CM

## 2022-11-21 DIAGNOSIS — E53.8 VITAMIN B 12 DEFICIENCY: Primary | ICD-10-CM

## 2022-11-21 LAB
ALBUMIN SERPL-MCNC: 3 G/DL (ref 3.2–4.6)
ALBUMIN/GLOB SERPL: 0.8 {RATIO} (ref 0.4–1.6)
ALP SERPL-CCNC: 90 U/L (ref 50–136)
ALT SERPL-CCNC: 16 U/L (ref 12–65)
ANION GAP SERPL CALC-SCNC: 6 MMOL/L (ref 2–11)
AST SERPL-CCNC: 16 U/L (ref 15–37)
BILIRUB SERPL-MCNC: 0.6 MG/DL (ref 0.2–1.1)
BUN SERPL-MCNC: 19 MG/DL (ref 8–23)
CALCIUM SERPL-MCNC: 9.9 MG/DL (ref 8.3–10.4)
CHLORIDE SERPL-SCNC: 100 MMOL/L (ref 101–110)
CO2 SERPL-SCNC: 29 MMOL/L (ref 21–32)
CREAT SERPL-MCNC: 1 MG/DL (ref 0.8–1.5)
ERYTHROCYTE [DISTWIDTH] IN BLOOD BY AUTOMATED COUNT: 16.3 % (ref 11.9–14.6)
GLOBULIN SER CALC-MCNC: 4 G/DL (ref 2.8–4.5)
GLUCOSE SERPL-MCNC: 137 MG/DL (ref 65–100)
HCT VFR BLD AUTO: 40.4 % (ref 41.1–50.3)
HGB BLD-MCNC: 11.9 G/DL (ref 13.6–17.2)
MCH RBC QN AUTO: 26.3 PG (ref 26.1–32.9)
MCHC RBC AUTO-ENTMCNC: 29.5 G/DL (ref 31.4–35)
MCV RBC AUTO: 89.4 FL (ref 82–102)
NRBC # BLD: 0 K/UL (ref 0–0.2)
PLATELET # BLD AUTO: 374 K/UL (ref 150–450)
PMV BLD AUTO: 9.5 FL (ref 9.4–12.3)
POTASSIUM SERPL-SCNC: 4.3 MMOL/L (ref 3.5–5.1)
PROT SERPL-MCNC: 7 G/DL (ref 6.3–8.2)
RBC # BLD AUTO: 4.52 M/UL (ref 4.23–5.6)
SODIUM SERPL-SCNC: 135 MMOL/L (ref 133–143)
TSH, 3RD GENERATION: 2.68 UIU/ML (ref 0.36–3.74)
WBC # BLD AUTO: 8.5 K/UL (ref 4.3–11.1)

## 2022-11-21 PROCEDURE — 1123F ACP DISCUSS/DSCN MKR DOCD: CPT | Performed by: INTERNAL MEDICINE

## 2022-11-21 PROCEDURE — G8484 FLU IMMUNIZE NO ADMIN: HCPCS | Performed by: INTERNAL MEDICINE

## 2022-11-21 PROCEDURE — 99214 OFFICE O/P EST MOD 30 MIN: CPT | Performed by: INTERNAL MEDICINE

## 2022-11-21 PROCEDURE — G8427 DOCREV CUR MEDS BY ELIG CLIN: HCPCS | Performed by: INTERNAL MEDICINE

## 2022-11-21 PROCEDURE — G8420 CALC BMI NORM PARAMETERS: HCPCS | Performed by: INTERNAL MEDICINE

## 2022-11-21 PROCEDURE — 96372 THER/PROPH/DIAG INJ SC/IM: CPT | Performed by: INTERNAL MEDICINE

## 2022-11-21 PROCEDURE — 3074F SYST BP LT 130 MM HG: CPT | Performed by: INTERNAL MEDICINE

## 2022-11-21 PROCEDURE — 1036F TOBACCO NON-USER: CPT | Performed by: INTERNAL MEDICINE

## 2022-11-21 PROCEDURE — 3078F DIAST BP <80 MM HG: CPT | Performed by: INTERNAL MEDICINE

## 2022-11-21 RX ORDER — FERROUS SULFATE 325(65) MG
325 TABLET ORAL
COMMUNITY

## 2022-11-21 RX ORDER — CYANOCOBALAMIN 1000 UG/ML
1000 INJECTION, SOLUTION INTRAMUSCULAR; SUBCUTANEOUS ONCE
Status: COMPLETED | OUTPATIENT
Start: 2022-11-21 | End: 2022-11-21

## 2022-11-21 RX ADMIN — CYANOCOBALAMIN 1000 MCG: 1000 INJECTION, SOLUTION INTRAMUSCULAR; SUBCUTANEOUS at 09:34

## 2022-11-21 NOTE — PROGRESS NOTES
Gloria Bellamy was seen today for follow-up and wound check. Diagnoses and all orders for this visit:    Vitamin B 12 deficiency  -     cyanocobalamin injection 1,000 mcg  -     Midhraun 50; Future  -     Comprehensive Metabolic Panel; Future  -     TSH; Future  -     TSH  -     Comprehensive Metabolic Panel  -     CBC    Pressure injury of sacral region, stage 2 (HCC)  -     [address] [facility]    Hyponatremia  -     CBC; Future  -     Comprehensive Metabolic Panel; Future  -     TSH;  Future  -     TSH  -     Comprehensive Metabolic Panel  -     CBC        Loanne Schwab is a 78 y.o. male    Chief Complaint   Patient presents with    Follow-up    Wound Check     On buttock         Admission on 08/10/2022, Discharged on 08/10/2022   Component Date Value Ref Range Status    WBC 08/10/2022 7.6  4.3 - 11.1 K/uL Final    RBC 08/10/2022 4.70  4.23 - 5.6 M/uL Final    Hemoglobin 08/10/2022 12.6 (A)  13.6 - 17.2 g/dL Final    Hematocrit 08/10/2022 40.0 (A)  41.1 - 50.3 % Final    MCV 08/10/2022 85.1  79.6 - 97.8 FL Final    MCH 08/10/2022 26.8  26.1 - 32.9 PG Final    MCHC 08/10/2022 31.5  31.4 - 35.0 g/dL Final    RDW 08/10/2022 15.6 (A)  11.9 - 14.6 % Final    Platelets 27/46/3560 238  150 - 450 K/uL Final    MPV 08/10/2022 9.2 (A)  9.4 - 12.3 FL Final    nRBC 08/10/2022 0.00  0.0 - 0.2 K/uL Final    **Note: Absolute NRBC parameter is now reported with Hemogram**    Differential Type 08/10/2022 AUTOMATED    Final    Seg Neutrophils 08/10/2022 68  43 - 78 % Final    Lymphocytes 08/10/2022 17  13 - 44 % Final    Monocytes 08/10/2022 8  4.0 - 12.0 % Final    Eosinophils % 08/10/2022 2  0.5 - 7.8 % Final    Basophils 08/10/2022 1  0.0 - 2.0 % Final    Immature Granulocytes 08/10/2022 5  0.0 - 5.0 % Final    Segs Absolute 08/10/2022 5.2  1.7 - 8.2 K/UL Final    Absolute Lymph # 08/10/2022 1.3  0.5 - 4.6 K/UL Final    Absolute Mono # 08/10/2022 0.6  0.1 - 1.3 K/UL Final    Absolute Eos # 08/10/2022 0.1  0.0 - 0.8 K/UL Final    Basophils Absolute 08/10/2022 0.1  0.0 - 0.2 K/UL Final    Absolute Immature Granulocyte 08/10/2022 0.4  0.0 - 0.5 K/UL Final    Sodium 08/10/2022 135 (A)  136 - 145 mmol/L Final    Potassium 08/10/2022 3.2 (A)  3.5 - 5.1 mmol/L Final    Chloride 08/10/2022 103  98 - 107 mmol/L Final    CO2 08/10/2022 25  21 - 32 mmol/L Final    Anion Gap 08/10/2022 7  7 - 16 mmol/L Final    Glucose 08/10/2022 80  65 - 100 mg/dL Final    BUN 08/10/2022 23  8 - 23 MG/DL Final    Creatinine 08/10/2022 0.85  0.8 - 1.5 MG/DL Final    GFR  08/10/2022 >60  >60 ml/min/1.73m2 Final    GFR Non- 08/10/2022 >60  >60 ml/min/1.73m2 Final    Comment:      Estimated GFR is calculated using the Modification of Diet in Renal Disease (MDRD) Study equation, reported for both  Americans (GFRAA) and non- Americans (GFRNA), and normalized to 1.73m2 body surface area. The physician must decide which value applies to the patient. The MDRD study equation should only be used in individuals age 25 or older. It has not been validated for the following: pregnant women, patients with serious comorbid conditions,or on certain medications, or persons with extremes of body size, muscle mass, or nutritional status.       Calcium 08/10/2022 8.6  8.3 - 10.4 MG/DL Final    Total Bilirubin 08/10/2022 1.0  0.2 - 1.1 MG/DL Final    ALT 08/10/2022 12  12 - 65 U/L Final    AST 08/10/2022 16  15 - 37 U/L Final    Alk Phosphatase 08/10/2022 93  50 - 136 U/L Final    Total Protein 08/10/2022 7.6  6.3 - 8.2 g/dL Final    Albumin 08/10/2022 2.8 (A)  3.2 - 4.6 g/dL Final    Globulin 08/10/2022 4.8 (A)  2.3 - 3.5 g/dL Final    Albumin/Globulin Ratio 08/10/2022 0.6 (A)  1.2 - 3.5   Final    Ventricular Rate 08/10/2022 93  BPM Final    Atrial Rate 08/10/2022 93  BPM Final    P-R Interval 08/10/2022 238  ms Final    QRS Duration 08/10/2022 96  ms Final    Q-T Interval 08/10/2022 382  ms Final QTc Calculation (Bazett) 08/10/2022 474  ms Final    P Axis 08/10/2022 70  degrees Final    R Axis 08/10/2022 25  degrees Final    T Axis 08/10/2022 62  degrees Final    Diagnosis 08/10/2022 Sinus rhythm   Final    Lactic Acid, Plasma 08/10/2022 2.1 (A)  0.4 - 2.0 MMOL/L Final    Lactic Acid, Plasma 08/10/2022 2.3 (A)  0.4 - 2.0 MMOL/L Final    TSH w Free Thyroid if Abnormal 08/10/2022 3.80 (A)  0.358 - 3.740 UIU/ML Final    POC Glucose 08/10/2022 83  65 - 100 mg/dL Final    Comment: 47 - 60 mg/dl Consistent with, but not fully diagnostic of hypoglycemia. 101 - 125 mg/dl Impaired fasting glucose/consistent with pre-diabetes mellitus  > 126 mg/dl Fasting glucose consistent with overt diabetes mellitus      Performed by: 08/10/2022 TimDN   Final    T4 Free 08/10/2022 1.0  0.78 - 1.46 NG/DL Final    Color, UA 08/10/2022 YELLOW/STRAW    Final    Color Reference Range: Straw, Yellow or Dark Yellow    Appearance 08/10/2022 CLEAR    Final    Specific Gravity, UA 08/10/2022 1.014  1.001 - 1.023   Final    pH, Urine 08/10/2022 5.5  5.0 - 9.0   Final    Protein, UA 08/10/2022 Negative  NEG mg/dL Final    Glucose, UA 08/10/2022 Negative  mg/dL Final    Ketones, Urine 08/10/2022 Negative  NEG mg/dL Final    Bilirubin Urine 08/10/2022 Negative  NEG   Final    Blood, Urine 08/10/2022 Negative  NEG   Final    Urobilinogen, Urine 08/10/2022 1.0  0.2 - 1.0 EU/dL Final    Nitrite, Urine 08/10/2022 Negative  NEG   Final    Leukocyte Esterase, Urine 08/10/2022 TRACE (A)  NEG   Final    WBC, UA 08/10/2022 0-4  0 /hpf Final    RBC, UA 08/10/2022 0-5  0 /hpf Final    Epithelial Cells UA 08/10/2022 0-5  0 /hpf Final    BACTERIA, URINE 08/10/2022 Negative  0 /hpf Final    Casts 08/10/2022 0-2  0 /lpf Final        Past Medical History:   Diagnosis Date    Abnormal cardiovascular function study     Aortic valve regurgitation 01/04/2016    Echo:LV EF=55%. Normal LV size. Mild to moderate AI with mildly dilated aortic root (3.8 cm) and aortic sclerosis. Bilateral cataracts     Colon polyps     COPD (chronic obstructive pulmonary disease) (HCC)     Coronary atherosclerosis of native coronary vessel 2016    Coronary clcifications noted on chest CT scan on 2015. Normal myocardial perfusion scan (normal perfusion and EF=76%) on 16.     Elevated hemoglobin A1c measurement 2017    Hyperlipidemia 2016       Family History   Problem Relation Age of Onset    Cancer Father         Colon    Hypertension Mother         Social History     Socioeconomic History    Marital status:      Spouse name: Not on file    Number of children: Not on file    Years of education: Not on file    Highest education level: Not on file   Occupational History    Not on file   Tobacco Use    Smoking status: Former     Types: Cigarettes     Quit date: 1990     Years since quittin.9    Smokeless tobacco: Never   Substance and Sexual Activity    Alcohol use: No     Alcohol/week: 0.0 standard drinks    Drug use: No    Sexual activity: Not on file   Other Topics Concern    Not on file   Social History Narrative    Not on file     Social Determinants of Health     Financial Resource Strain: Not on file   Food Insecurity: Not on file   Transportation Needs: Not on file   Physical Activity: Not on file   Stress: Not on file   Social Connections: Not on file   Intimate Partner Violence: Not on file   Housing Stability: Not on file         Current Outpatient Medications:     metoprolol tartrate (LOPRESSOR) 50 MG tablet, PRN WITH ELEVATED B/P, Disp: , Rfl:     cyanocobalamin 1000 MCG/ML injection, Inject 1,000 mcg into the muscle every 30 days , Disp: , Rfl:     ferrous sulfate (IRON 325) 325 (65 Fe) MG tablet, Take 325 mg by mouth daily (with breakfast), Disp: , Rfl:     acetaminophen (TYLENOL) 325 MG tablet, 650 mg every 8 hours as needed (Patient not taking: Reported on 2022), Disp: , Rfl:     donepezil (ARICEPT) 5 MG tablet, Take 1 tablet by mouth nightly (Patient not taking: Reported on 11/21/2022), Disp: 30 tablet, Rfl: 5    Allergies   Allergen Reactions    Erythromycin Rash    Sulfa Antibiotics Rash         Review of Systems      Vitals:    11/21/22 0928   BP: 110/60   Pulse: (!) 107   SpO2: 97%   Weight: 146 lb 12.8 oz (66.6 kg)           Physical Exam  Constitutional:       Appearance: He is obese. He is not ill-appearing. Cardiovascular:      Rate and Rhythm: Normal rate. Pulmonary:      Effort: Pulmonary effort is normal.      Breath sounds: Normal breath sounds. Abdominal:      General: There is no distension. Musculoskeletal:      Cervical back: Normal range of motion and neck supple. Right lower leg: No edema. Left lower leg: No edema. Skin:     Coloration: Skin is not jaundiced. Neurological:      General: No focal deficit present. Mental Status: He is alert. Mental status is at baseline. Gait: Gait abnormal.   Psychiatric:         Mood and Affect: Mood normal.         Thought Content: Thought content normal.          Gloria Bellamy was seen today for follow-up and wound check. Diagnoses and all orders for this visit:    Vitamin B 12 deficiency  -     cyanocobalamin injection 1,000 mcg  -     Midhraun 50; Future  -     Comprehensive Metabolic Panel; Future  -     TSH; Future  -     TSH  -     Comprehensive Metabolic Panel  -     CBC    Pressure injury of sacral region, stage 2 (HCC)  -     [address] [facility]    Hyponatremia  -     CBC; Future  -     Comprehensive Metabolic Panel; Future  -     TSH;  Future  -     TSH  -     Comprehensive Metabolic Panel  -     CBC               Dickson Canavan, DO

## 2022-12-21 ENCOUNTER — OFFICE VISIT (OUTPATIENT)
Dept: INTERNAL MEDICINE CLINIC | Facility: CLINIC | Age: 82
End: 2022-12-21
Payer: MEDICARE

## 2022-12-21 VITALS
HEIGHT: 73 IN | HEART RATE: 110 BPM | SYSTOLIC BLOOD PRESSURE: 126 MMHG | DIASTOLIC BLOOD PRESSURE: 84 MMHG | BODY MASS INDEX: 19.19 KG/M2 | OXYGEN SATURATION: 99 % | WEIGHT: 144.8 LBS

## 2022-12-21 DIAGNOSIS — Z23 NEED FOR INFLUENZA VACCINATION: ICD-10-CM

## 2022-12-21 DIAGNOSIS — E53.8 VITAMIN B 12 DEFICIENCY: Primary | ICD-10-CM

## 2022-12-21 DIAGNOSIS — F33.2 SEVERE EPISODE OF RECURRENT MAJOR DEPRESSIVE DISORDER, WITHOUT PSYCHOTIC FEATURES (HCC): ICD-10-CM

## 2022-12-21 DIAGNOSIS — L89.152 PRESSURE INJURY OF SACRAL REGION, STAGE 2 (HCC): ICD-10-CM

## 2022-12-21 DIAGNOSIS — G30.9 ALZHEIMER'S DISEASE, UNSPECIFIED (CODE) (HCC): ICD-10-CM

## 2022-12-21 PROCEDURE — 90694 VACC AIIV4 NO PRSRV 0.5ML IM: CPT | Performed by: INTERNAL MEDICINE

## 2022-12-21 PROCEDURE — 1123F ACP DISCUSS/DSCN MKR DOCD: CPT | Performed by: INTERNAL MEDICINE

## 2022-12-21 PROCEDURE — G8420 CALC BMI NORM PARAMETERS: HCPCS | Performed by: INTERNAL MEDICINE

## 2022-12-21 PROCEDURE — G8427 DOCREV CUR MEDS BY ELIG CLIN: HCPCS | Performed by: INTERNAL MEDICINE

## 2022-12-21 PROCEDURE — G0008 ADMIN INFLUENZA VIRUS VAC: HCPCS | Performed by: INTERNAL MEDICINE

## 2022-12-21 PROCEDURE — 99213 OFFICE O/P EST LOW 20 MIN: CPT | Performed by: INTERNAL MEDICINE

## 2022-12-21 PROCEDURE — 1036F TOBACCO NON-USER: CPT | Performed by: INTERNAL MEDICINE

## 2022-12-21 PROCEDURE — 3074F SYST BP LT 130 MM HG: CPT | Performed by: INTERNAL MEDICINE

## 2022-12-21 PROCEDURE — 96372 THER/PROPH/DIAG INJ SC/IM: CPT | Performed by: INTERNAL MEDICINE

## 2022-12-21 PROCEDURE — G8484 FLU IMMUNIZE NO ADMIN: HCPCS | Performed by: INTERNAL MEDICINE

## 2022-12-21 PROCEDURE — 3078F DIAST BP <80 MM HG: CPT | Performed by: INTERNAL MEDICINE

## 2022-12-21 RX ORDER — CYANOCOBALAMIN 1000 UG/ML
1000 INJECTION, SOLUTION INTRAMUSCULAR; SUBCUTANEOUS ONCE
Status: COMPLETED | OUTPATIENT
Start: 2022-12-21 | End: 2022-12-21

## 2022-12-21 RX ADMIN — CYANOCOBALAMIN 1000 MCG: 1000 INJECTION, SOLUTION INTRAMUSCULAR; SUBCUTANEOUS at 11:02

## 2022-12-21 SDOH — ECONOMIC STABILITY: FOOD INSECURITY: WITHIN THE PAST 12 MONTHS, YOU WORRIED THAT YOUR FOOD WOULD RUN OUT BEFORE YOU GOT MONEY TO BUY MORE.: NEVER TRUE

## 2022-12-21 SDOH — ECONOMIC STABILITY: FOOD INSECURITY: WITHIN THE PAST 12 MONTHS, THE FOOD YOU BOUGHT JUST DIDN'T LAST AND YOU DIDN'T HAVE MONEY TO GET MORE.: NEVER TRUE

## 2022-12-21 ASSESSMENT — PATIENT HEALTH QUESTIONNAIRE - PHQ9
SUM OF ALL RESPONSES TO PHQ QUESTIONS 1-9: 0
SUM OF ALL RESPONSES TO PHQ QUESTIONS 1-9: 0
1. LITTLE INTEREST OR PLEASURE IN DOING THINGS: 0
9. THOUGHTS THAT YOU WOULD BE BETTER OFF DEAD, OR OF HURTING YOURSELF: 0
5. POOR APPETITE OR OVEREATING: 0
7. TROUBLE CONCENTRATING ON THINGS, SUCH AS READING THE NEWSPAPER OR WATCHING TELEVISION: 0
2. FEELING DOWN, DEPRESSED OR HOPELESS: 0
4. FEELING TIRED OR HAVING LITTLE ENERGY: 0
10. IF YOU CHECKED OFF ANY PROBLEMS, HOW DIFFICULT HAVE THESE PROBLEMS MADE IT FOR YOU TO DO YOUR WORK, TAKE CARE OF THINGS AT HOME, OR GET ALONG WITH OTHER PEOPLE: 0
SUM OF ALL RESPONSES TO PHQ QUESTIONS 1-9: 0
SUM OF ALL RESPONSES TO PHQ9 QUESTIONS 1 & 2: 0
SUM OF ALL RESPONSES TO PHQ QUESTIONS 1-9: 0
3. TROUBLE FALLING OR STAYING ASLEEP: 0
8. MOVING OR SPEAKING SO SLOWLY THAT OTHER PEOPLE COULD HAVE NOTICED. OR THE OPPOSITE, BEING SO FIGETY OR RESTLESS THAT YOU HAVE BEEN MOVING AROUND A LOT MORE THAN USUAL: 0
6. FEELING BAD ABOUT YOURSELF - OR THAT YOU ARE A FAILURE OR HAVE LET YOURSELF OR YOUR FAMILY DOWN: 0

## 2022-12-21 ASSESSMENT — SOCIAL DETERMINANTS OF HEALTH (SDOH): HOW HARD IS IT FOR YOU TO PAY FOR THE VERY BASICS LIKE FOOD, HOUSING, MEDICAL CARE, AND HEATING?: NOT HARD AT ALL

## 2022-12-21 NOTE — PROGRESS NOTES
Franc Benjamin was seen today for wound check and anorexia. Diagnoses and all orders for this visit:    Vitamin B 12 deficiency  -     cyanocobalamin injection 1,000 mcg    Need for influenza vaccination  -     Influenza, FLUAD, (age 72 y+), IM, PF, 0.5 mL    Severe episode of recurrent major depressive disorder, without psychotic features ([facility] [address]    Alzheimer's disease, unspecified    Pressure injury of sacral region, stage 2 ([facility] [address]  Comments:  melanie Anaya is a 78 y.o. male    Chief Complaint   Patient presents with    Wound Check     Reports wound has healed   Needing B12 injection    Anorexia     Reports loss of appetite   On donepezil at some point. Wt Readings from Last 3 Encounters:   12/21/22 144 lb 12.8 oz (65.7 kg)   11/21/22 146 lb 12.8 oz (66.6 kg)   08/15/22 147 lb (66.7 kg)       Office Visit on 11/21/2022   Component Date Value Ref Range Status    TSH, 3RD GENERATION 11/21/2022 2.680  0.358 - 3.740 uIU/mL Final    Sodium 11/21/2022 135  133 - 143 mmol/L Final    Potassium 11/21/2022 4.3  3.5 - 5.1 mmol/L Final    Chloride 11/21/2022 100 (A)  101 - 110 mmol/L Final    CO2 11/21/2022 29  21 - 32 mmol/L Final    Anion Gap 11/21/2022 6  2 - 11 mmol/L Final    Glucose 11/21/2022 137 (A)  65 - 100 mg/dL Final    BUN 11/21/2022 19  8 - 23 MG/DL Final    Creatinine 11/21/2022 1.00  0.8 - 1.5 MG/DL Final    Est, Glom Filt Rate 11/21/2022 >60  >60 ml/min/1.73m2 Final    Comment:   Pediatric calculator link: [employer].at. org/professionals/kdoqi/gfr_calculatorped    Effective Oct 3, 2022    These results are not intended for use in patients <25years of age. eGFR results are calculated without a race factor using  the 2021 CKD-EPI equation. Careful clinical correlation is recommended, particularly when comparing to results calculated using previous equations.   The CKD-EPI equation is less accurate in patients with extremes of muscle mass, extra-renal metabolism of creatinine, excessive creatine ingestion, or following therapy that affects renal tubular secretion. Calcium 11/21/2022 9.9  8.3 - 10.4 MG/DL Final    Total Bilirubin 11/21/2022 0.6  0.2 - 1.1 MG/DL Final    ALT 11/21/2022 16  12 - 65 U/L Final    AST 11/21/2022 16  15 - 37 U/L Final    Alk Phosphatase 11/21/2022 90  50 - 136 U/L Final    Total Protein 11/21/2022 7.0  6.3 - 8.2 g/dL Final    Albumin 11/21/2022 3.0 (A)  3.2 - 4.6 g/dL Final    Globulin 11/21/2022 4.0  2.8 - 4.5 g/dL Final    Albumin/Globulin Ratio 11/21/2022 0.8  0.4 - 1.6   Final    WBC 11/21/2022 8.5  4.3 - 11.1 K/uL Final    RBC 11/21/2022 4.52  4.23 - 5.6 M/uL Final    Hemoglobin 11/21/2022 11.9 (A)  13.6 - 17.2 g/dL Final    Hematocrit 11/21/2022 40.4 (A)  41.1 - 50.3 % Final    MCV 11/21/2022 89.4  82 - 102 FL Final    MCH 11/21/2022 26.3  26.1 - 32.9 PG Final    MCHC 11/21/2022 29.5 (A)  31.4 - 35.0 g/dL Final    RDW 11/21/2022 16.3 (A)  11.9 - 14.6 % Final    Platelets 47/20/3036 374  150 - 450 K/uL Final    MPV 11/21/2022 9.5  9.4 - 12.3 FL Final    nRBC 11/21/2022 0.00  0.0 - 0.2 K/uL Final    **Note: Absolute NRBC parameter is now reported with Hemogram**        Past Medical History:   Diagnosis Date    Abnormal cardiovascular function study     Aortic valve regurgitation 01/04/2016    Echo:LV EF=55%. Normal LV size. Mild to moderate AI with mildly dilated aortic root (3.8 cm) and aortic sclerosis. Bilateral cataracts     Colon polyps     COPD (chronic obstructive pulmonary disease) (HCC)     Coronary atherosclerosis of native coronary vessel 02/12/2016    Coronary clcifications noted on chest CT scan on 12-4-2015. Normal myocardial perfusion scan (normal perfusion and EF=76%) on 2-12-16.     Elevated hemoglobin A1c measurement 1/13/2017    Hyperlipidemia 9/6/2016       Family History   Problem Relation Age of Onset    Cancer Father         Colon    Hypertension Mother         Social History     Socioeconomic History    Marital status:  Spouse name: Not on file    Number of children: Not on file    Years of education: Not on file    Highest education level: Not on file   Occupational History    Not on file   Tobacco Use    Smoking status: Former     Types: Cigarettes     Quit date: 1990     Years since quittin.0    Smokeless tobacco: Never   Substance and Sexual Activity    Alcohol use: No     Alcohol/week: 0.0 standard drinks    Drug use: No    Sexual activity: Not on file   Other Topics Concern    Not on file   Social History Narrative    Not on file     Social Determinants of Health     Financial Resource Strain: Low Risk     Difficulty of Paying Living Expenses: Not hard at all   Food Insecurity: No Food Insecurity    Worried About Running Out of Food in the Last Year: Never true    Ran Out of Food in the Last Year: Never true   Transportation Needs: Not on file   Physical Activity: Not on file   Stress: Not on file   Social Connections: Not on file   Intimate Partner Violence: Not on file   Housing Stability: Not on file         Current Outpatient Medications:     ferrous sulfate (IRON 325) 325 (65 Fe) MG tablet, Take 325 mg by mouth daily (with breakfast), Disp: , Rfl:     metoprolol tartrate (LOPRESSOR) 50 MG tablet, PRN WITH ELEVATED B/P (Patient not taking: Reported on 2022), Disp: , Rfl:     acetaminophen (TYLENOL) 325 MG tablet, 650 mg every 8 hours as needed (Patient not taking: Reported on 2022), Disp: , Rfl:     cyanocobalamin 1000 MCG/ML injection, Inject 1,000 mcg into the muscle every 30 days , Disp: , Rfl:     Allergies   Allergen Reactions    Erythromycin Rash    Sulfa Antibiotics Rash         Review of Systems      Vitals:    22 1032   BP: 126/84   Site: Left Upper Arm   Cuff Size: Large Adult   Pulse: (!) 110   SpO2: 99%   Weight: 144 lb 12.8 oz (65.7 kg)   Height: 6' 1\" (1.854 m)           Physical Exam  Constitutional:       General: He is not in acute distress. Appearance: Normal appearance. He is not ill-appearing. HENT:      Head: Normocephalic and atraumatic. Nose: Nose normal.   Eyes:      General: No scleral icterus. Extraocular Movements: Extraocular movements intact. Conjunctiva/sclera: Conjunctivae normal.   Cardiovascular:      Rate and Rhythm: Normal rate and regular rhythm. Pulses: Normal pulses. Heart sounds: Normal heart sounds. Pulmonary:      Effort: Pulmonary effort is normal. No respiratory distress. Breath sounds: Normal breath sounds. Abdominal:      General: Abdomen is flat. Bowel sounds are normal.      Palpations: Abdomen is soft. Musculoskeletal:      Cervical back: Neck supple. No rigidity. Right lower leg: No edema. Left lower leg: No edema. Skin:     Coloration: Skin is not jaundiced. Neurological:      General: No focal deficit present. Mental Status: He is alert. Psychiatric:         Mood and Affect: Mood normal.         Thought Content: Thought content normal.          Franc Benjamin was seen today for wound check and anorexia.     Diagnoses and all orders for this visit:    Vitamin B 12 deficiency  -     cyanocobalamin injection 1,000 mcg    Need for influenza vaccination  -     Influenza, FLUAD, (age 72 y+), IM, PF, 0.5 mL    Severe episode of recurrent major depressive disorder, without psychotic features ([address]    Alzheimer's disease, unspecified    Pressure injury of sacral region, stage 2 ([address]  Comments:  melanie Michelle DO

## 2023-04-06 ENCOUNTER — OFFICE VISIT (OUTPATIENT)
Dept: INTERNAL MEDICINE CLINIC | Facility: CLINIC | Age: 83
End: 2023-04-06
Payer: MEDICARE

## 2023-04-06 VITALS
BODY MASS INDEX: 19.35 KG/M2 | OXYGEN SATURATION: 98 % | HEART RATE: 90 BPM | RESPIRATION RATE: 17 BRPM | SYSTOLIC BLOOD PRESSURE: 122 MMHG | DIASTOLIC BLOOD PRESSURE: 70 MMHG | HEIGHT: 73 IN | WEIGHT: 146 LBS

## 2023-04-06 DIAGNOSIS — G30.9 ALZHEIMER'S DISEASE, UNSPECIFIED (CODE) (HCC): ICD-10-CM

## 2023-04-06 DIAGNOSIS — E44.1 MILD PROTEIN-CALORIE MALNUTRITION (HCC): ICD-10-CM

## 2023-04-06 DIAGNOSIS — Z00.00 MEDICARE ANNUAL WELLNESS VISIT, SUBSEQUENT: Primary | ICD-10-CM

## 2023-04-06 PROBLEM — E46 PROTEIN CALORIE MALNUTRITION (HCC): Status: ACTIVE | Noted: 2023-04-06

## 2023-04-06 PROCEDURE — 3074F SYST BP LT 130 MM HG: CPT | Performed by: INTERNAL MEDICINE

## 2023-04-06 PROCEDURE — G0439 PPPS, SUBSEQ VISIT: HCPCS | Performed by: INTERNAL MEDICINE

## 2023-04-06 PROCEDURE — 3078F DIAST BP <80 MM HG: CPT | Performed by: INTERNAL MEDICINE

## 2023-04-06 PROCEDURE — 1123F ACP DISCUSS/DSCN MKR DOCD: CPT | Performed by: INTERNAL MEDICINE

## 2023-04-06 RX ORDER — DONEPEZIL HYDROCHLORIDE 5 MG/1
5 TABLET, FILM COATED ORAL NIGHTLY
Qty: 30 TABLET | Refills: 5 | Status: SHIPPED | OUTPATIENT
Start: 2023-04-06

## 2023-04-06 SDOH — ECONOMIC STABILITY: INCOME INSECURITY: HOW HARD IS IT FOR YOU TO PAY FOR THE VERY BASICS LIKE FOOD, HOUSING, MEDICAL CARE, AND HEATING?: NOT HARD AT ALL

## 2023-04-06 SDOH — ECONOMIC STABILITY: HOUSING INSECURITY
IN THE LAST 12 MONTHS, WAS THERE A TIME WHEN YOU DID NOT HAVE A STEADY PLACE TO SLEEP OR SLEPT IN A SHELTER (INCLUDING NOW)?: NO

## 2023-04-06 SDOH — ECONOMIC STABILITY: FOOD INSECURITY: WITHIN THE PAST 12 MONTHS, THE FOOD YOU BOUGHT JUST DIDN'T LAST AND YOU DIDN'T HAVE MONEY TO GET MORE.: NEVER TRUE

## 2023-04-06 SDOH — ECONOMIC STABILITY: FOOD INSECURITY: WITHIN THE PAST 12 MONTHS, YOU WORRIED THAT YOUR FOOD WOULD RUN OUT BEFORE YOU GOT MONEY TO BUY MORE.: NEVER TRUE

## 2023-04-06 ASSESSMENT — PATIENT HEALTH QUESTIONNAIRE - PHQ9
8. MOVING OR SPEAKING SO SLOWLY THAT OTHER PEOPLE COULD HAVE NOTICED. OR THE OPPOSITE, BEING SO FIGETY OR RESTLESS THAT YOU HAVE BEEN MOVING AROUND A LOT MORE THAN USUAL: 0
4. FEELING TIRED OR HAVING LITTLE ENERGY: 0
10. IF YOU CHECKED OFF ANY PROBLEMS, HOW DIFFICULT HAVE THESE PROBLEMS MADE IT FOR YOU TO DO YOUR WORK, TAKE CARE OF THINGS AT HOME, OR GET ALONG WITH OTHER PEOPLE: 0
SUM OF ALL RESPONSES TO PHQ9 QUESTIONS 1 & 2: 0
1. LITTLE INTEREST OR PLEASURE IN DOING THINGS: 0
SUM OF ALL RESPONSES TO PHQ QUESTIONS 1-9: 0
SUM OF ALL RESPONSES TO PHQ QUESTIONS 1-9: 0
5. POOR APPETITE OR OVEREATING: 0
2. FEELING DOWN, DEPRESSED OR HOPELESS: 0
SUM OF ALL RESPONSES TO PHQ QUESTIONS 1-9: 0
3. TROUBLE FALLING OR STAYING ASLEEP: 0
9. THOUGHTS THAT YOU WOULD BE BETTER OFF DEAD, OR OF HURTING YOURSELF: 0
7. TROUBLE CONCENTRATING ON THINGS, SUCH AS READING THE NEWSPAPER OR WATCHING TELEVISION: 0
SUM OF ALL RESPONSES TO PHQ QUESTIONS 1-9: 0
6. FEELING BAD ABOUT YOURSELF - OR THAT YOU ARE A FAILURE OR HAVE LET YOURSELF OR YOUR FAMILY DOWN: 0

## 2023-04-06 ASSESSMENT — LIFESTYLE VARIABLES
HOW MANY STANDARD DRINKS CONTAINING ALCOHOL DO YOU HAVE ON A TYPICAL DAY: PATIENT DOES NOT DRINK
HOW OFTEN DO YOU HAVE A DRINK CONTAINING ALCOHOL: NEVER

## 2023-04-06 NOTE — PATIENT INSTRUCTIONS
stop and talk to your doctor. Where can you learn more? Go to http://www.robin.com/ and enter P600 to learn more about \"Learning About Being Active as an Older Adult. \"  Current as of: October 10, 2022               Content Version: 13.6  © 2006-2023 Healthwise, Inc[employer]. Care instructions adapted under license by [facility] [place] ([facility]). If you have questions about a medical condition or this instruction, always ask your healthcare professional. [place] [address] any warranty or liability for your use of this information. Learning About Dental Care for Older Adults  Dental care for older adults: Overview  Dental care for older people is much the same as for younger adults. But older adults do have concerns that younger adults do not. Older adults may have problems with gum disease and decay on the roots of their teeth. They may need missing teeth replaced or broken fillings fixed. Or they may have dentures that need to be cared for. Some older adults may have trouble holding a toothbrush. You can help remind the person you are caring for to brush and floss their teeth or to clean their dentures. In some cases, you may need to do the brushing and other dental care tasks. People who have trouble using their hands or who have dementia may need this extra help. How can you help with dental care? Normal dental care  To keep the teeth and gums healthy:  Brush the teeth with fluoride toothpaste twice a day--in the morning and at night--and floss at least once a day. Plaque can quickly build up on the teeth of older adults. Watch for the signs of gum disease. These signs include gums that bleed after brushing or after eating hard foods, such as apples. See a dentist regularly. Many experts recommend checkups every 6 months. Keep the dentist up to date on any new medications the person is taking.   Encourage a balanced diet that includes whole grains, vegetables, and fruits, and

## 2023-04-06 NOTE — PROGRESS NOTES
you seen the dentist within the past year?: (!) No    Intervention:  Advised to schedule with their dentist     Vision Screen:  Do you have difficulty driving, watching TV, or doing any of your daily activities because of your eyesight?: No  Have you had an eye exam within the past year?: (!) No  No results found. Interventions:   Patient declines any further evaluation or treatment                      Objective   Vitals:    04/06/23 1008   BP: 122/70   Site: Left Upper Arm   Position: Sitting   Pulse: 90   Resp: 17   SpO2: 98%   Weight: 146 lb (66.2 kg)   Height: 6' 1\" (1.854 m)      Body mass index is 19.26 kg/m². Allergies   Allergen Reactions    Erythromycin Rash    Sulfa Antibiotics Rash     Prior to Visit Medications    Medication Sig Taking?  Authorizing Provider   donepezil (ARICEPT) 5 MG tablet Take 1 tablet by mouth nightly Yes Manolo Hunt DO   ferrous sulfate (IRON 325) 325 (65 Fe) MG tablet Take 1 tablet by mouth daily (with breakfast) Yes Historical Provider, MD   cyanocobalamin 1000 MCG/ML injection Inject 1 mL into the muscle every 30 days Yes Ar Automatic Reconciliation   metoprolol tartrate (LOPRESSOR) 50 MG tablet PRN WITH ELEVATED B/P  Patient not taking: Reported on 12/21/2022  Historical Provider, MD   acetaminophen (TYLENOL) 325 MG tablet 650 mg every 8 hours as needed  Patient not taking: Reported on 11/21/2022  Historical Provider, MD [place] (Including outside providers/suppliers regularly involved in providing care):   Patient Care Team:  Consuelo Hays DO as PCP - [address] [place], [place] [address]DO as PCP - [facility] Provider  Ally Marcus RN as Ambulatory Care Manager     Reviewed and updated this visit:  Tobacco  Allergies  Meds  Med Hx  Surg Hx  Soc Hx  Fam Hx             Consuelo Hays DO

## 2023-05-25 ENCOUNTER — TELEPHONE (OUTPATIENT)
Dept: INTERNAL MEDICINE CLINIC | Facility: CLINIC | Age: 83
End: 2023-05-25

## 2023-05-25 RX ORDER — FOLIC ACID 1 MG/1
1 TABLET ORAL DAILY
Qty: 90 TABLET | Refills: 1 | Status: SHIPPED | OUTPATIENT
Start: 2023-05-25

## 2023-10-06 ENCOUNTER — NURSE ONLY (OUTPATIENT)
Dept: INTERNAL MEDICINE CLINIC | Facility: CLINIC | Age: 83
End: 2023-10-06

## 2023-10-06 DIAGNOSIS — R73.09 ELEVATED GLUCOSE LEVEL: ICD-10-CM

## 2023-10-06 DIAGNOSIS — E87.6 HYPOKALEMIA: ICD-10-CM

## 2023-10-06 DIAGNOSIS — I10 ESSENTIAL HYPERTENSION: Primary | ICD-10-CM

## 2023-10-06 DIAGNOSIS — I10 ESSENTIAL HYPERTENSION: ICD-10-CM

## 2023-10-06 DIAGNOSIS — E87.1 HYPONATREMIA: ICD-10-CM

## 2023-10-06 LAB
ALBUMIN SERPL-MCNC: 3.4 G/DL (ref 3.2–4.6)
ALBUMIN/GLOB SERPL: 0.8 (ref 0.4–1.6)
ALP SERPL-CCNC: 110 U/L (ref 50–136)
ALT SERPL-CCNC: 14 U/L (ref 12–65)
ANION GAP SERPL CALC-SCNC: 8 MMOL/L (ref 2–11)
AST SERPL-CCNC: 13 U/L (ref 15–37)
BILIRUB SERPL-MCNC: 0.8 MG/DL (ref 0.2–1.1)
BUN SERPL-MCNC: 20 MG/DL (ref 8–23)
CALCIUM SERPL-MCNC: 9.5 MG/DL (ref 8.3–10.4)
CHLORIDE SERPL-SCNC: 104 MMOL/L (ref 101–110)
CHOLEST SERPL-MCNC: 171 MG/DL
CO2 SERPL-SCNC: 28 MMOL/L (ref 21–32)
CREAT SERPL-MCNC: 1.2 MG/DL (ref 0.8–1.5)
ERYTHROCYTE [DISTWIDTH] IN BLOOD BY AUTOMATED COUNT: 16.4 % (ref 11.9–14.6)
GLOBULIN SER CALC-MCNC: 4.1 G/DL (ref 2.8–4.5)
GLUCOSE SERPL-MCNC: 146 MG/DL (ref 65–100)
HCT VFR BLD AUTO: 46 % (ref 41.1–50.3)
HDLC SERPL-MCNC: 45 MG/DL (ref 40–60)
HDLC SERPL: 3.8
HGB BLD-MCNC: 14.4 G/DL (ref 13.6–17.2)
LDLC SERPL CALC-MCNC: 97.8 MG/DL
MCH RBC QN AUTO: 27.5 PG (ref 26.1–32.9)
MCHC RBC AUTO-ENTMCNC: 31.3 G/DL (ref 31.4–35)
MCV RBC AUTO: 87.8 FL (ref 82–102)
NRBC # BLD: 0 K/UL (ref 0–0.2)
PLATELET # BLD AUTO: 266 K/UL (ref 150–450)
PMV BLD AUTO: 10 FL (ref 9.4–12.3)
POTASSIUM SERPL-SCNC: 4.5 MMOL/L (ref 3.5–5.1)
PROT SERPL-MCNC: 7.5 G/DL (ref 6.3–8.2)
RBC # BLD AUTO: 5.24 M/UL (ref 4.23–5.6)
SODIUM SERPL-SCNC: 140 MMOL/L (ref 133–143)
TRIGL SERPL-MCNC: 141 MG/DL (ref 35–150)
TSH, 3RD GENERATION: 2.72 UIU/ML (ref 0.36–3.74)
VLDLC SERPL CALC-MCNC: 28.2 MG/DL (ref 6–23)
WBC # BLD AUTO: 8 K/UL (ref 4.3–11.1)

## 2023-10-07 LAB
EST. AVERAGE GLUCOSE BLD GHB EST-MCNC: 117 MG/DL
HBA1C MFR BLD: 5.7 % (ref 4.8–5.6)

## 2023-10-24 ENCOUNTER — TELEPHONE (OUTPATIENT)
Dept: INTERNAL MEDICINE CLINIC | Facility: CLINIC | Age: 83
End: 2023-10-24

## 2023-10-24 NOTE — TELEPHONE ENCOUNTER
Kala Canon called the patient was D/C'ed from [facility]. She was calling to get a verbal ok for plan of care. Ok to start POC.

## 2023-11-13 ENCOUNTER — OFFICE VISIT (OUTPATIENT)
Dept: INTERNAL MEDICINE CLINIC | Facility: CLINIC | Age: 83
End: 2023-11-13
Payer: MEDICARE

## 2023-11-13 VITALS
DIASTOLIC BLOOD PRESSURE: 70 MMHG | HEART RATE: 81 BPM | WEIGHT: 154 LBS | BODY MASS INDEX: 20.41 KG/M2 | HEIGHT: 73 IN | TEMPERATURE: 98.8 F | OXYGEN SATURATION: 93 % | SYSTOLIC BLOOD PRESSURE: 138 MMHG

## 2023-11-13 DIAGNOSIS — E44.1 MILD PROTEIN-CALORIE MALNUTRITION (HCC): ICD-10-CM

## 2023-11-13 DIAGNOSIS — G30.9 ALZHEIMER'S DISEASE, UNSPECIFIED (CODE) (HCC): Primary | ICD-10-CM

## 2023-11-13 PROCEDURE — G8420 CALC BMI NORM PARAMETERS: HCPCS | Performed by: INTERNAL MEDICINE

## 2023-11-13 PROCEDURE — 1036F TOBACCO NON-USER: CPT | Performed by: INTERNAL MEDICINE

## 2023-11-13 PROCEDURE — 3078F DIAST BP <80 MM HG: CPT | Performed by: INTERNAL MEDICINE

## 2023-11-13 PROCEDURE — G8484 FLU IMMUNIZE NO ADMIN: HCPCS | Performed by: INTERNAL MEDICINE

## 2023-11-13 PROCEDURE — 3075F SYST BP GE 130 - 139MM HG: CPT | Performed by: INTERNAL MEDICINE

## 2023-11-13 PROCEDURE — 1123F ACP DISCUSS/DSCN MKR DOCD: CPT | Performed by: INTERNAL MEDICINE

## 2023-11-13 PROCEDURE — G8427 DOCREV CUR MEDS BY ELIG CLIN: HCPCS | Performed by: INTERNAL MEDICINE

## 2023-11-13 PROCEDURE — 99214 OFFICE O/P EST MOD 30 MIN: CPT | Performed by: INTERNAL MEDICINE

## 2023-11-13 ASSESSMENT — ENCOUNTER SYMPTOMS
SHORTNESS OF BREATH: 0
CONSTIPATION: 0
CHEST TIGHTNESS: 0
NAUSEA: 0
VOMITING: 0
ABDOMINAL PAIN: 0
WHEEZING: 0
SINUS PAIN: 0
DIARRHEA: 0

## 2023-11-13 NOTE — PROGRESS NOTES
and aortic sclerosis. Bilateral cataracts     Colon polyps     COPD (chronic obstructive pulmonary disease) (HCC)     Coronary atherosclerosis of native coronary vessel 2016    Coronary clcifications noted on chest CT scan on 2015. Normal myocardial perfusion scan (normal perfusion and EF=76%) on 16. Elevated hemoglobin A1c measurement 2017    Hyperlipidemia 2016       Family History   Problem Relation Age of Onset    Cancer Father         Colon    Hypertension Mother         Social History     Socioeconomic History    Marital status:      Spouse name: Not on file    Number of children: Not on file    Years of education: Not on file    Highest education level: Not on file   Occupational History    Not on file   Tobacco Use    Smoking status: Former     Types: Cigarettes     Quit date: 1990     Years since quittin.9    Smokeless tobacco: Never   Substance and Sexual Activity    Alcohol use: No     Alcohol/week: 0.0 standard drinks of alcohol    Drug use: No    Sexual activity: Not Currently   Other Topics Concern    Not on file   Social History Narrative    Not on file     Social Determinants of Health     Financial Resource Strain: Low Risk  (2023)    Overall Financial Resource Strain (CARDIA)     Difficulty of Paying Living Expenses: Not hard at all   Food Insecurity: No Food Insecurity (2023)    Hunger Vital Sign     Worried About Running Out of Food in the Last Year: Never true     [address] in the Last Year: Never true   Transportation Needs: Unknown (2023)    PRAPARE - Transportation     Lack of Transportation (Medical): Not on file     Lack of Transportation (Non-Medical):  No   Physical Activity: Inactive (2023)    Exercise Vital Sign     Days of Exercise per Week: 0 days     Minutes of Exercise per Session: 0 min   Stress: Not on file   Social Connections: Not on file   Intimate Partner Violence: Not on file   Housing Stability: Unknown

## 2024-04-12 ENCOUNTER — HOSPITAL ENCOUNTER (EMERGENCY)
Age: 84
Discharge: HOME | End: 2024-04-12
Attending: EMERGENCY MEDICINE
Payer: MEDICARE

## 2024-04-12 ENCOUNTER — APPOINTMENT (OUTPATIENT)
Dept: GENERAL RADIOLOGY | Age: 84
End: 2024-04-12
Payer: MEDICARE

## 2024-04-12 VITALS
TEMPERATURE: 97.4 F | RESPIRATION RATE: 17 BRPM | HEART RATE: 77 BPM | SYSTOLIC BLOOD PRESSURE: 149 MMHG | BODY MASS INDEX: 20.32 KG/M2 | DIASTOLIC BLOOD PRESSURE: 81 MMHG | OXYGEN SATURATION: 95 % | HEIGHT: 73 IN

## 2024-04-12 DIAGNOSIS — Z00.00 WELL ADULT HEALTH CHECK: Primary | ICD-10-CM

## 2024-04-12 PROCEDURE — 71045 X-RAY EXAM CHEST 1 VIEW: CPT

## 2024-04-12 PROCEDURE — 99283 EMERGENCY DEPT VISIT LOW MDM: CPT

## 2024-04-12 ASSESSMENT — PAIN SCALES - PAIN ASSESSMENT IN ADVANCED DEMENTIA (PAINAD)
BREATHING: NORMAL
TOTALSCORE: 0
CONSOLABILITY: NO NEED TO CONSOLE
BODYLANGUAGE: RELAXED
FACIALEXPRESSION: SMILING OR INEXPRESSIVE

## 2024-04-12 ASSESSMENT — ENCOUNTER SYMPTOMS
DIARRHEA: 0
VOMITING: 0
ABDOMINAL PAIN: 0
SHORTNESS OF BREATH: 0

## 2024-04-12 ASSESSMENT — PAIN - FUNCTIONAL ASSESSMENT: PAIN_FUNCTIONAL_ASSESSMENT: PAIN ASSESSMENT IN ADVANCED DEMENTIA (PAINAD)

## 2024-04-12 NOTE — DISCHARGE INSTRUCTIONS
Mr. Luna had no evidence of difficulty swallowing.  He drank water and ate a container pudding without any difficulty.  There is no drooling or shortness of breath. Without any choking spells or other complications, concerns for difficulty swallowing is not a medical emergency and needs to be evaluated as an outpatient.

## 2024-04-12 NOTE — ED NOTES
Patient currently waiting for [facility] ambulance to return him home     Seema Mejía RN  04/12/24 [id number]

## 2024-04-12 NOTE — ED TRIAGE NOTES
Pt arrives via [facility] from the [facility]. Nurses at the facility states pt has been having issues drinking thin liquids and that he was spitting into a cup. Pt drinking water during triage without difficulty.

## 2024-04-12 NOTE — ED NOTES
TRANSFER - OUT REPORT:    Verbal report given to Gerda Ayala RN on Heber Luna  being transferred to [place] at [facility] for routine progression of patient care       Report consisted of patient's Situation, Background, Assessment and   Recommendations(SBAR).     Information from the following report(s) ED SBAR was reviewed with the receiving nurse.    [place] Fall Assessment:                           Lines:       Opportunity for questions and clarification was provided.      Patient transported with:  [facility] ambulance          Seema Mejía RN  04/12/24 [id number]

## 2025-02-01 ENCOUNTER — HOSPITAL ENCOUNTER (EMERGENCY)
Age: 85
Discharge: HOME | End: 2025-02-01
Attending: EMERGENCY MEDICINE
Payer: MEDICARE

## 2025-02-01 ENCOUNTER — APPOINTMENT (OUTPATIENT)
Dept: CT IMAGING | Age: 85
End: 2025-02-01
Payer: MEDICARE

## 2025-02-01 VITALS
HEART RATE: 67 BPM | DIASTOLIC BLOOD PRESSURE: 89 MMHG | TEMPERATURE: 97.5 F | HEIGHT: 73 IN | BODY MASS INDEX: 20.49 KG/M2 | RESPIRATION RATE: 14 BRPM | WEIGHT: 154.6 LBS | OXYGEN SATURATION: 99 % | SYSTOLIC BLOOD PRESSURE: 160 MMHG
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
## 2025-02-01 PROCEDURE — 99284 EMERGENCY DEPT VISIT MOD MDM: CPT

## 2025-02-01 PROCEDURE — 72125 CT NECK SPINE W/O DYE: CPT

## 2025-02-01 PROCEDURE — 70450 CT HEAD/BRAIN W/O DYE: CPT

## 2025-02-01 ASSESSMENT — PAIN - FUNCTIONAL ASSESSMENT: PAIN_FUNCTIONAL_ASSESSMENT: NONE - DENIES PAIN

## 2025-02-01 ASSESSMENT — LIFESTYLE VARIABLES
HOW OFTEN DO YOU HAVE A DRINK CONTAINING ALCOHOL: NEVER
HOW MANY STANDARD DRINKS CONTAINING ALCOHOL DO YOU HAVE ON A TYPICAL DAY: PATIENT DOES NOT DRINK

## 2025-02-01 NOTE — ED TRIAGE NOTES
Pt arrives via [facility] coming from the [facility] after an unwitnessed fall. No known head injury. A&0x1 at baseline. Hx dementia. No thinners

## 2025-02-01 NOTE — ED NOTES
Pt cleansed of large soft brown BM. Pt placed in clean brief.     Manuel Sandoval, MALIA  02/01/25 [id number]

## 2025-02-06 ASSESSMENT — ENCOUNTER SYMPTOMS
NAUSEA: 0
VOMITING: 0

## 2025-02-06 NOTE — ED PROVIDER NOTES
Emergency Department Provider Note       PCP: Simona Steele MD   Age: 84 y.o.   Sex: male     DISPOSITION Decision To Discharge 02/01/2025 03:31:26 PM   DISPOSITION CONDITION Stable            ICD-10-CM    1. Fall, initial encounter  W19.XXXA           Medical Decision Making     Unwitnessed fall but without injury or complaint, CT scan of the head and neck are okay.  Patient cleared to go back to assisted living     1 acute, uncomplicated illness or injury.  Patient was discharged risks and benefits of hospitalization were considered.  Chronic medical problems impacting care include dementia.  Shared medical decision making was utilized in creating the patients health plan today.    I independently ordered and reviewed each unique test.       I interpreted the CT Scan of the head and neck are negative for fracture, dislocation, hemorrhage.              History     84-year-old gentleman with dementia sustained an unwitnessed fall at his assisted living.  He has no injuries and no complaints.  Staff sent him here to get checked out.  He does not appear to be on any anticoagulants        ROS     Review of Systems   Unable to perform ROS: Dementia   Gastrointestinal:  Negative for nausea and vomiting.   Musculoskeletal:  Negative for neck pain and neck stiffness.   Neurological:  Negative for headaches.   Psychiatric/Behavioral:  Positive for confusion.    All other systems reviewed and are negative.       Physical Exam     Vitals signs and nursing note reviewed:  Vitals:    02/01/25 1441 02/01/25 1449 02/01/25 1615 02/01/25 1704   BP: (!) 159/91 (!) 160/89     Pulse: 69 67     Resp:  14     Temp:       SpO2: 91% 95% 99%    Weight:    70.1 kg (154 lb 9.6 oz)   Height:    1.854 m (6' 1\")      Physical Exam  Vitals and nursing note reviewed.   Constitutional:       General: He is not in acute distress.     Appearance: Normal appearance. He is not ill-appearing.   HENT:      Head: Normocephalic and